# Patient Record
Sex: MALE | Race: WHITE | NOT HISPANIC OR LATINO | Employment: FULL TIME | URBAN - METROPOLITAN AREA
[De-identification: names, ages, dates, MRNs, and addresses within clinical notes are randomized per-mention and may not be internally consistent; named-entity substitution may affect disease eponyms.]

---

## 2018-10-26 ENCOUNTER — OFFICE VISIT (OUTPATIENT)
Dept: URGENT CARE | Facility: URGENT CARE | Age: 43
End: 2018-10-26
Payer: COMMERCIAL

## 2018-10-26 VITALS
SYSTOLIC BLOOD PRESSURE: 125 MMHG | DIASTOLIC BLOOD PRESSURE: 86 MMHG | HEART RATE: 69 BPM | TEMPERATURE: 98.1 F | OXYGEN SATURATION: 98 % | WEIGHT: 184.6 LBS

## 2018-10-26 DIAGNOSIS — M25.50 ARTHRALGIA, UNSPECIFIED JOINT: ICD-10-CM

## 2018-10-26 DIAGNOSIS — M79.10 MYALGIA: Primary | ICD-10-CM

## 2018-10-26 LAB
ALBUMIN SERPL-MCNC: 3.7 G/DL (ref 3.4–5)
ALP SERPL-CCNC: 50 U/L (ref 40–150)
ALT SERPL W P-5'-P-CCNC: 18 U/L (ref 0–70)
ANION GAP SERPL CALCULATED.3IONS-SCNC: 7 MMOL/L (ref 3–14)
AST SERPL W P-5'-P-CCNC: 18 U/L (ref 0–45)
BASOPHILS # BLD AUTO: 0 10E9/L (ref 0–0.2)
BASOPHILS NFR BLD AUTO: 0.2 %
BILIRUB SERPL-MCNC: 0.5 MG/DL (ref 0.2–1.3)
BUN SERPL-MCNC: 17 MG/DL (ref 7–30)
CALCIUM SERPL-MCNC: 8.4 MG/DL (ref 8.5–10.1)
CHLORIDE SERPL-SCNC: 106 MMOL/L (ref 94–109)
CK SERPL-CCNC: 103 U/L (ref 30–300)
CO2 SERPL-SCNC: 26 MMOL/L (ref 20–32)
CREAT SERPL-MCNC: 0.92 MG/DL (ref 0.66–1.25)
CRP SERPL-MCNC: 14.1 MG/L (ref 0–8)
DEPRECATED S PYO AG THROAT QL EIA: NEGATIVE
DIFFERENTIAL METHOD BLD: NORMAL
EOSINOPHIL # BLD AUTO: 0 10E9/L (ref 0–0.7)
EOSINOPHIL NFR BLD AUTO: 0.7 %
ERYTHROCYTE [DISTWIDTH] IN BLOOD BY AUTOMATED COUNT: 12.7 % (ref 10–15)
ERYTHROCYTE [SEDIMENTATION RATE] IN BLOOD BY WESTERGREN METHOD: 10 MM/H (ref 0–15)
GFR SERPL CREATININE-BSD FRML MDRD: 90 ML/MIN/1.7M2
GLUCOSE SERPL-MCNC: 117 MG/DL (ref 70–99)
HCT VFR BLD AUTO: 45 % (ref 40–53)
HGB BLD-MCNC: 15.6 G/DL (ref 13.3–17.7)
LYMPHOCYTES # BLD AUTO: 1 10E9/L (ref 0.8–5.3)
LYMPHOCYTES NFR BLD AUTO: 22.9 %
MCH RBC QN AUTO: 31.3 PG (ref 26.5–33)
MCHC RBC AUTO-ENTMCNC: 34.7 G/DL (ref 31.5–36.5)
MCV RBC AUTO: 90 FL (ref 78–100)
MONOCYTES # BLD AUTO: 0.6 10E9/L (ref 0–1.3)
MONOCYTES NFR BLD AUTO: 14.4 %
NEUTROPHILS # BLD AUTO: 2.7 10E9/L (ref 1.6–8.3)
NEUTROPHILS NFR BLD AUTO: 61.8 %
PLATELET # BLD AUTO: 205 10E9/L (ref 150–450)
POTASSIUM SERPL-SCNC: 3.7 MMOL/L (ref 3.4–5.3)
PROT SERPL-MCNC: 7.6 G/DL (ref 6.8–8.8)
RBC # BLD AUTO: 4.99 10E12/L (ref 4.4–5.9)
SODIUM SERPL-SCNC: 139 MMOL/L (ref 133–144)
SPECIMEN SOURCE: NORMAL
WBC # BLD AUTO: 4.3 10E9/L (ref 4–11)

## 2018-10-26 PROCEDURE — 85652 RBC SED RATE AUTOMATED: CPT | Performed by: PHYSICIAN ASSISTANT

## 2018-10-26 PROCEDURE — 86431 RHEUMATOID FACTOR QUANT: CPT | Performed by: PHYSICIAN ASSISTANT

## 2018-10-26 PROCEDURE — 86618 LYME DISEASE ANTIBODY: CPT | Performed by: PHYSICIAN ASSISTANT

## 2018-10-26 PROCEDURE — 87081 CULTURE SCREEN ONLY: CPT | Performed by: PHYSICIAN ASSISTANT

## 2018-10-26 PROCEDURE — 86039 ANTINUCLEAR ANTIBODIES (ANA): CPT | Performed by: PHYSICIAN ASSISTANT

## 2018-10-26 PROCEDURE — 82550 ASSAY OF CK (CPK): CPT | Performed by: PHYSICIAN ASSISTANT

## 2018-10-26 PROCEDURE — 86200 CCP ANTIBODY: CPT | Performed by: PHYSICIAN ASSISTANT

## 2018-10-26 PROCEDURE — 86140 C-REACTIVE PROTEIN: CPT | Performed by: PHYSICIAN ASSISTANT

## 2018-10-26 PROCEDURE — 87880 STREP A ASSAY W/OPTIC: CPT | Performed by: PHYSICIAN ASSISTANT

## 2018-10-26 PROCEDURE — 86038 ANTINUCLEAR ANTIBODIES: CPT | Performed by: PHYSICIAN ASSISTANT

## 2018-10-26 PROCEDURE — 36415 COLL VENOUS BLD VENIPUNCTURE: CPT | Performed by: PHYSICIAN ASSISTANT

## 2018-10-26 PROCEDURE — 80053 COMPREHEN METABOLIC PANEL: CPT | Performed by: PHYSICIAN ASSISTANT

## 2018-10-26 PROCEDURE — 99204 OFFICE O/P NEW MOD 45 MIN: CPT | Performed by: PHYSICIAN ASSISTANT

## 2018-10-26 PROCEDURE — 85025 COMPLETE CBC W/AUTO DIFF WBC: CPT | Performed by: PHYSICIAN ASSISTANT

## 2018-10-26 RX ORDER — CYCLOBENZAPRINE HCL 10 MG
10 TABLET ORAL
Qty: 20 TABLET | Refills: 1 | Status: ON HOLD | OUTPATIENT
Start: 2018-10-26 | End: 2018-10-29

## 2018-10-26 RX ORDER — NAPROXEN 500 MG/1
500 TABLET ORAL 2 TIMES DAILY PRN
Qty: 30 TABLET | Refills: 1 | Status: SHIPPED | OUTPATIENT
Start: 2018-10-26 | End: 2022-09-19

## 2018-10-26 NOTE — MR AVS SNAPSHOT
"              After Visit Summary   10/26/2018    Geovany Sarmiento    MRN: [id number]           Patient Information     Date Of Birth          1975        Visit Information        Provider Department      10/26/2018 2:05 PM Galina Rodriguez PA-C [facility]        Today's Diagnoses     Myalgia    -  1    Arthralgia, unspecified joint          Care Instructions    Obtain Primary and follow up next week for muscle and joint pain          Follow-ups after your visit        Who to contact     If you have questions or need follow up information about today's clinic visit or your schedule please contact [facility] directly at [phone].  Normal or non-critical lab and imaging results will be communicated to you by MyChart, letter or phone within 4 business days after the clinic has received the results. If you do not hear from us within 7 days, please contact the clinic through MyChart or phone. If you have a critical or abnormal lab result, we will notify you by phone as soon as possible.  Submit refill requests through [employer] or call your pharmacy and they will forward the refill request to us. Please allow 3 business days for your refill to be completed.          Additional Information About Your Visit        MyChart Information     [employer] lets you send messages to your doctor, view your test results, renew your prescriptions, schedule appointments and more. To sign up, go to [email or web address] . Click on \"Log in\" on the left side of the screen, which will take you to the Welcome page. Then click on \"Sign up Now\" on the right side of the page.     You will be asked to enter the access code listed below, as well as some personal information. Please follow the directions to create your username and password.     Your access code is: [email or web address]  Expires: 2019  3:51 PM     Your access code will  in 90 days. If you need help or a new code, please call your " [facility] or [phone].        Care EveryWhere ID     This is your Care EveryWhere ID. This could be used by other organizations to access your [facility] medical records  [id number]        Your Vitals Were     Pulse Temperature Pulse Oximetry             69 98.1  F (36.7  C) (Oral) 98%          Blood Pressure from Last 3 Encounters:   10/26/18 125/86    Weight from Last 3 Encounters:   10/26/18 184 lb 9.6 oz (83.7 kg)              We Performed the Following     Anti Nuclear Constance IgG by IFA with Reflex     CBC with platelets differential     CK total     Comprehensive metabolic panel     CRP inflammation     Cyclic Citrullinated Peptide Antibody IgG     ESR: Erythrocyte sedimentation rate     Lyme Disease Constance with reflex to WB Serum     Rapid strep screen     Rheumatoid factor          Today's Medication Changes          These changes are accurate as of 10/26/18  3:51 PM.  If you have any questions, ask your nurse or doctor.               Start taking these medicines.        Dose/Directions    cyclobenzaprine 10 MG tablet   Commonly known as:  FLEXERIL   Used for:  Myalgia, Arthralgia, unspecified joint   Started by:  Galina Rodriguez PA-C        Dose:  10 mg   Take 1 tablet (10 mg) by mouth nightly as needed for muscle spasms   Quantity:  20 tablet   Refills:  1       naproxen 500 MG tablet   Commonly known as:  NAPROSYN   Used for:  Myalgia, Arthralgia, unspecified joint   Started by:  Galina Rodriguez PA-C        Dose:  500 mg   Take 1 tablet (500 mg) by mouth 2 times daily as needed for moderate pain   Quantity:  30 tablet   Refills:  1            Where to get your medicines      These medications were sent to [facility] - [place], [place] - [address]  [address], [facility] [address]     Phone:  [phone]     cyclobenzaprine 10 MG tablet    naproxen 500 MG tablet                Primary Care Provider Fax #    Provider Not In System [phone]                 Equal Access to Services     [facility] : Hadii aad ku hadbeverlyjesica Irmaali, waaxda luqadaha, qaybta kaalmalily kelly. So [facility] [phone].    ATENCIÓN: Si habla español, tiene a snyder disposición servicios gratuitos de asistencia lingüística. [place] al [phone].    We comply with applicable federal civil rights laws and [place] laws. We do not discriminate on the basis of race, color, national origin, age, disability, sex, sexual orientation, or gender identity.            Thank you!     Thank you for choosing [facility]  for your care. Our goal is always to provide you with excellent care. Hearing back from our patients is one way we can continue to improve our services. Please take a few minutes to complete the written survey that you may receive in the mail after your visit with us. Thank you!             Your Updated Medication List - Protect others around you: Learn how to safely use, store and throw away your medicines at www.disposemymeds.org.          This list is accurate as of 10/26/18  3:51 PM.  Always use your most recent med list.                   Brand Name Dispense Instructions for use Diagnosis    cyclobenzaprine 10 MG tablet    FLEXERIL    20 tablet    Take 1 tablet (10 mg) by mouth nightly as needed for muscle spasms    Myalgia, Arthralgia, unspecified joint       naproxen 500 MG tablet    NAPROSYN    30 tablet    Take 1 tablet (500 mg) by mouth 2 times daily as needed for moderate pain    Myalgia, Arthralgia, unspecified joint

## 2018-10-26 NOTE — PROGRESS NOTES
44 yo male here for evaluation of myalgias and arthralgias that started Wednesday, approximately 48 hours ago.  No prior history of anything like this.  No family history of rheumatoid arthritis.  He notes he also has a sore throat with it.  No cough.  As above.  He now he denies fever.  No rashes.  No dysuria, frequency urgency.  He notices motor weakness.  It was hard to even lift a gallon of milk this morning.  No dysuria, frequency, or urgency.  No headache, dizziness, shortness of breath or chest pain.      No Known Allergies    No past medical history on file.  Not diabetic.      No current outpatient prescriptions on file prior to visit.  No current facility-administered medications on file prior to visit.     Social History   Substance Use Topics     Smoking status: Never Smoker     Smokeless tobacco: Never Used     Alcohol use Not on file       ROS:  CONSTITUTIONAL: Negative for fatigue or fever.  EYES: Negative for eye problems.  ENT: As above.  RESP: As above.  CV: Negative for chest pains.  GI: Negative for vomiting.  MUSCULOSKELETAL: As above   NEUROLOGIC: Negative for headaches.  SKIN: Negative for rash.    OBJECTIVE:  /86 (BP Location: Left arm, Patient Position: Chair, Cuff Size: Adult Regular)  Pulse 69  Temp 98.1  F (36.7  C) (Oral)  Wt 184 lb 9.6 oz (83.7 kg)  SpO2 98%  GENERAL APPEARANCE: Healthy, alert and no distress.  EYES:Conjunctiva/sclera clear.  EARS: No cerumen.   Ear canals w/o erythema.  TM's intact w/o erythema.    NOSE/MOUTH: Nose without ulcers, erythema or lesions.  SINUSES: No maxillary sinus tenderness.  THROAT: No erythema w/o tonsillar enlargement . No exudates.  NECK: Supple, nontender, no lymphadenopathy.  RESP: Lungs clear to auscultation - no rales, rhonchi or wheezes  CV: Regular rate and rhythm, normal S1 S2, no murmur noted.  NEURO: Awake, alert    SKIN: No rashes  Shoulders, elbows, wrists, hips, knees with mild bilateral tenderness.  No skin color change noted  over the joints.  No actual swelling over the joints noted.  Motor- upper and lower extremities 5/5.  He does have some giveaway due to pain.  Reflexes symmetric and physiologic throughout upper and lower extremities.    Results for orders placed or performed in visit on 10/26/18   CBC with platelets differential   Result Value Ref Range    WBC 4.3 4.0 - 11.0 10e9/L    RBC Count 4.99 4.4 - 5.9 10e12/L    Hemoglobin 15.6 13.3 - 17.7 g/dL    Hematocrit 45.0 40.0 - 53.0 %    MCV 90 78 - 100 fl    MCH 31.3 26.5 - 33.0 pg    MCHC 34.7 31.5 - 36.5 g/dL    RDW 12.7 10.0 - 15.0 %    Platelet Count 205 150 - 450 10e9/L    % Neutrophils 61.8 %    % Lymphocytes 22.9 %    % Monocytes 14.4 %    % Eosinophils 0.7 %    % Basophils 0.2 %    Absolute Neutrophil 2.7 1.6 - 8.3 10e9/L    Absolute Lymphocytes 1.0 0.8 - 5.3 10e9/L    Absolute Monocytes 0.6 0.0 - 1.3 10e9/L    Absolute Eosinophils 0.0 0.0 - 0.7 10e9/L    Absolute Basophils 0.0 0.0 - 0.2 10e9/L    Diff Method Automated Method    ESR: Erythrocyte sedimentation rate   Result Value Ref Range    Sed Rate 10 0 - 15 mm/h   Rapid strep screen   Result Value Ref Range    Specimen Description Throat     Rapid Strep A Screen Negative        ASSESSMENT:     ICD-10-CM    1. Myalgia M79.10 CBC with platelets differential     ESR: Erythrocyte sedimentation rate     CRP inflammation     CK total     Comprehensive metabolic panel     Rheumatoid factor     Lyme Disease Constance with reflex to WB Serum     Cyclic Citrullinated Peptide Antibody IgG     Anti Nuclear Constance IgG by IFA with Reflex     Rapid strep screen   2. Arthralgia, unspecified joint M25.50 CBC with platelets differential     ESR: Erythrocyte sedimentation rate     CRP inflammation     CK total     Comprehensive metabolic panel     Rheumatoid factor     Lyme Disease Constance with reflex to WB Serum     Cyclic Citrullinated Peptide Antibody IgG     Anti Nuclear Constance IgG by IFA with Reflex     Rapid strep screen         PLAN: Unclear  etiology. No fever or cough.  With acute onset I wonder about infectious etiology although complete blood cell count was normal here tonight. Rest of labs pending and will contact him with results.  If pain worsens or fever or new neurologic signs or symptoms develop over the weekend he should go to the emergency room.  Otherwise obtain a primary and follow-up next week.  Lots of rest and fluids.  RTC if any worsening symptoms or if not improving.    Galina Rodriguez PA-C

## 2018-10-27 ENCOUNTER — APPOINTMENT (OUTPATIENT)
Dept: MRI IMAGING | Facility: CLINIC | Age: 43
DRG: 558 | End: 2018-10-27
Attending: EMERGENCY MEDICINE
Payer: COMMERCIAL

## 2018-10-27 ENCOUNTER — HOSPITAL ENCOUNTER (INPATIENT)
Facility: CLINIC | Age: 43
LOS: 2 days | Discharge: HOME | DRG: 558 | End: 2018-10-29
Attending: EMERGENCY MEDICINE | Admitting: PSYCHIATRY & NEUROLOGY
Payer: COMMERCIAL

## 2018-10-27 DIAGNOSIS — R53.1 WEAKNESS: ICD-10-CM

## 2018-10-27 LAB
ALBUMIN SERPL-MCNC: 4 G/DL (ref 3.4–5)
ALP SERPL-CCNC: 56 U/L (ref 40–150)
ALT SERPL W P-5'-P-CCNC: 22 U/L (ref 0–70)
ANION GAP SERPL CALCULATED.3IONS-SCNC: 8 MMOL/L (ref 3–14)
AST SERPL W P-5'-P-CCNC: 34 U/L (ref 0–45)
BACTERIA SPEC CULT: NORMAL
BASOPHILS # BLD AUTO: 0 10E9/L (ref 0–0.2)
BASOPHILS NFR BLD AUTO: 0.4 %
BILIRUB SERPL-MCNC: 0.4 MG/DL (ref 0.2–1.3)
BUN SERPL-MCNC: 24 MG/DL (ref 7–30)
CALCIUM SERPL-MCNC: 8.3 MG/DL (ref 8.5–10.1)
CHLORIDE SERPL-SCNC: 106 MMOL/L (ref 94–109)
CK SERPL-CCNC: 474 U/L (ref 30–300)
CO2 SERPL-SCNC: 26 MMOL/L (ref 20–32)
CREAT SERPL-MCNC: 0.83 MG/DL (ref 0.66–1.25)
CRP SERPL-MCNC: 6.4 MG/L (ref 0–8)
DIFFERENTIAL METHOD BLD: NORMAL
EOSINOPHIL # BLD AUTO: 0.1 10E9/L (ref 0–0.7)
EOSINOPHIL NFR BLD AUTO: 2.3 %
ERYTHROCYTE [DISTWIDTH] IN BLOOD BY AUTOMATED COUNT: 12.7 % (ref 10–15)
ERYTHROCYTE [SEDIMENTATION RATE] IN BLOOD BY WESTERGREN METHOD: 11 MM/H (ref 0–15)
FOLATE SERPL-MCNC: 11.4 NG/ML
GFR SERPL CREATININE-BSD FRML MDRD: >90 ML/MIN/1.7M2
GLUCOSE SERPL-MCNC: 103 MG/DL (ref 70–99)
HCT VFR BLD AUTO: 46.2 % (ref 40–53)
HGB BLD-MCNC: 16.1 G/DL (ref 13.3–17.7)
IMM GRANULOCYTES # BLD: 0 10E9/L (ref 0–0.4)
IMM GRANULOCYTES NFR BLD: 0 %
LYMPHOCYTES # BLD AUTO: 1.3 10E9/L (ref 0.8–5.3)
LYMPHOCYTES NFR BLD AUTO: 26.4 %
MCH RBC QN AUTO: 31.9 PG (ref 26.5–33)
MCHC RBC AUTO-ENTMCNC: 34.8 G/DL (ref 31.5–36.5)
MCV RBC AUTO: 92 FL (ref 78–100)
MONOCYTES # BLD AUTO: 0.4 10E9/L (ref 0–1.3)
MONOCYTES NFR BLD AUTO: 8 %
NEUTROPHILS # BLD AUTO: 3.1 10E9/L (ref 1.6–8.3)
NEUTROPHILS NFR BLD AUTO: 62.9 %
NRBC # BLD AUTO: 0 10*3/UL
NRBC BLD AUTO-RTO: 0 /100
PLATELET # BLD AUTO: 209 10E9/L (ref 150–450)
POTASSIUM SERPL-SCNC: 3.8 MMOL/L (ref 3.4–5.3)
PROT SERPL-MCNC: 7.9 G/DL (ref 6.8–8.8)
RBC # BLD AUTO: 5.04 10E12/L (ref 4.4–5.9)
SODIUM SERPL-SCNC: 141 MMOL/L (ref 133–144)
SPECIMEN SOURCE: NORMAL
TSH SERPL DL<=0.005 MIU/L-ACNC: 1.58 MU/L (ref 0.4–4)
VIT B12 SERPL-MCNC: 563 PG/ML (ref 193–986)
WBC # BLD AUTO: 4.9 10E9/L (ref 4–11)

## 2018-10-27 PROCEDURE — 82525 ASSAY OF COPPER: CPT | Performed by: STUDENT IN AN ORGANIZED HEALTH CARE EDUCATION/TRAINING PROGRAM

## 2018-10-27 PROCEDURE — A9585 GADOBUTROL INJECTION: HCPCS | Performed by: EMERGENCY MEDICINE

## 2018-10-27 PROCEDURE — 82607 VITAMIN B-12: CPT | Performed by: EMERGENCY MEDICINE

## 2018-10-27 PROCEDURE — 84443 ASSAY THYROID STIM HORMONE: CPT | Performed by: STUDENT IN AN ORGANIZED HEALTH CARE EDUCATION/TRAINING PROGRAM

## 2018-10-27 PROCEDURE — 86140 C-REACTIVE PROTEIN: CPT | Performed by: EMERGENCY MEDICINE

## 2018-10-27 PROCEDURE — 82550 ASSAY OF CK (CPK): CPT | Performed by: EMERGENCY MEDICINE

## 2018-10-27 PROCEDURE — 80053 COMPREHEN METABOLIC PANEL: CPT | Performed by: EMERGENCY MEDICINE

## 2018-10-27 PROCEDURE — 96361 HYDRATE IV INFUSION ADD-ON: CPT | Performed by: EMERGENCY MEDICINE

## 2018-10-27 PROCEDURE — 85652 RBC SED RATE AUTOMATED: CPT | Performed by: STUDENT IN AN ORGANIZED HEALTH CARE EDUCATION/TRAINING PROGRAM

## 2018-10-27 PROCEDURE — 86780 TREPONEMA PALLIDUM: CPT | Performed by: EMERGENCY MEDICINE

## 2018-10-27 PROCEDURE — 72156 MRI NECK SPINE W/O & W/DYE: CPT

## 2018-10-27 PROCEDURE — 84165 PROTEIN E-PHORESIS SERUM: CPT | Performed by: EMERGENCY MEDICINE

## 2018-10-27 PROCEDURE — 99285 EMERGENCY DEPT VISIT HI MDM: CPT | Mod: Z6 | Performed by: EMERGENCY MEDICINE

## 2018-10-27 PROCEDURE — 00000402 ZZHCL STATISTIC TOTAL PROTEIN: Performed by: EMERGENCY MEDICINE

## 2018-10-27 PROCEDURE — 82746 ASSAY OF FOLIC ACID SERUM: CPT | Performed by: EMERGENCY MEDICINE

## 2018-10-27 PROCEDURE — 85652 RBC SED RATE AUTOMATED: CPT | Performed by: EMERGENCY MEDICINE

## 2018-10-27 PROCEDURE — 25000132 ZZH RX MED GY IP 250 OP 250 PS 637: Performed by: STUDENT IN AN ORGANIZED HEALTH CARE EDUCATION/TRAINING PROGRAM

## 2018-10-27 PROCEDURE — 99285 EMERGENCY DEPT VISIT HI MDM: CPT | Mod: 25 | Performed by: EMERGENCY MEDICINE

## 2018-10-27 PROCEDURE — 25500064 ZZH RX 255 OP 636: Performed by: EMERGENCY MEDICINE

## 2018-10-27 PROCEDURE — 85025 COMPLETE CBC W/AUTO DIFF WBC: CPT | Performed by: EMERGENCY MEDICINE

## 2018-10-27 PROCEDURE — 25000128 H RX IP 250 OP 636: Performed by: EMERGENCY MEDICINE

## 2018-10-27 PROCEDURE — 84630 ASSAY OF ZINC: CPT | Performed by: STUDENT IN AN ORGANIZED HEALTH CARE EDUCATION/TRAINING PROGRAM

## 2018-10-27 PROCEDURE — 96374 THER/PROPH/DIAG INJ IV PUSH: CPT | Performed by: EMERGENCY MEDICINE

## 2018-10-27 PROCEDURE — 36415 COLL VENOUS BLD VENIPUNCTURE: CPT | Performed by: STUDENT IN AN ORGANIZED HEALTH CARE EDUCATION/TRAINING PROGRAM

## 2018-10-27 PROCEDURE — 12000008 ZZH R&B INTERMEDIATE UMMC

## 2018-10-27 RX ORDER — ACETAMINOPHEN 325 MG/1
975 TABLET ORAL EVERY 8 HOURS PRN
Status: DISCONTINUED | OUTPATIENT
Start: 2018-10-27 | End: 2018-10-29 | Stop reason: HOSPADM

## 2018-10-27 RX ORDER — MAGNESIUM SULFATE HEPTAHYDRATE 40 MG/ML
4 INJECTION, SOLUTION INTRAVENOUS EVERY 4 HOURS PRN
Status: DISCONTINUED | OUTPATIENT
Start: 2018-10-27 | End: 2018-10-29 | Stop reason: HOSPADM

## 2018-10-27 RX ORDER — AMOXICILLIN 250 MG
1 CAPSULE ORAL
Status: DISCONTINUED | OUTPATIENT
Start: 2018-10-27 | End: 2018-10-29 | Stop reason: HOSPADM

## 2018-10-27 RX ORDER — AMOXICILLIN 250 MG
2 CAPSULE ORAL
Status: DISCONTINUED | OUTPATIENT
Start: 2018-10-27 | End: 2018-10-29 | Stop reason: HOSPADM

## 2018-10-27 RX ORDER — POTASSIUM CHLORIDE 1.5 G/1.58G
20-40 POWDER, FOR SOLUTION ORAL
Status: DISCONTINUED | OUTPATIENT
Start: 2018-10-27 | End: 2018-10-29 | Stop reason: HOSPADM

## 2018-10-27 RX ORDER — POTASSIUM CHLORIDE 7.45 MG/ML
10 INJECTION INTRAVENOUS
Status: DISCONTINUED | OUTPATIENT
Start: 2018-10-27 | End: 2018-10-29 | Stop reason: HOSPADM

## 2018-10-27 RX ORDER — KETOROLAC TROMETHAMINE 30 MG/ML
30 INJECTION, SOLUTION INTRAMUSCULAR; INTRAVENOUS ONCE
Status: COMPLETED | OUTPATIENT
Start: 2018-10-27 | End: 2018-10-27

## 2018-10-27 RX ORDER — POTASSIUM CHLORIDE 750 MG/1
20-40 TABLET, EXTENDED RELEASE ORAL
Status: DISCONTINUED | OUTPATIENT
Start: 2018-10-27 | End: 2018-10-29 | Stop reason: HOSPADM

## 2018-10-27 RX ORDER — LIDOCAINE HYDROCHLORIDE 10 MG/ML
INJECTION, SOLUTION INFILTRATION; PERINEURAL
Status: DISCONTINUED
Start: 2018-10-27 | End: 2018-10-27 | Stop reason: HOSPADM

## 2018-10-27 RX ORDER — POTASSIUM CHLORIDE 29.8 MG/ML
20 INJECTION INTRAVENOUS
Status: DISCONTINUED | OUTPATIENT
Start: 2018-10-27 | End: 2018-10-29 | Stop reason: HOSPADM

## 2018-10-27 RX ORDER — SODIUM CHLORIDE 9 MG/ML
1000 INJECTION, SOLUTION INTRAVENOUS CONTINUOUS
Status: DISCONTINUED | OUTPATIENT
Start: 2018-10-27 | End: 2018-10-28

## 2018-10-27 RX ORDER — GADOBUTROL 604.72 MG/ML
10 INJECTION INTRAVENOUS ONCE
Status: COMPLETED | OUTPATIENT
Start: 2018-10-27 | End: 2018-10-27

## 2018-10-27 RX ORDER — NALOXONE HYDROCHLORIDE 0.4 MG/ML
.1-.4 INJECTION, SOLUTION INTRAMUSCULAR; INTRAVENOUS; SUBCUTANEOUS
Status: DISCONTINUED | OUTPATIENT
Start: 2018-10-27 | End: 2018-10-29 | Stop reason: HOSPADM

## 2018-10-27 RX ORDER — POTASSIUM CL/LIDO/0.9 % NACL 10MEQ/0.1L
10 INTRAVENOUS SOLUTION, PIGGYBACK (ML) INTRAVENOUS
Status: DISCONTINUED | OUTPATIENT
Start: 2018-10-27 | End: 2018-10-29 | Stop reason: HOSPADM

## 2018-10-27 RX ADMIN — GADOBUTROL 10 ML: 604.72 INJECTION INTRAVENOUS at 20:05

## 2018-10-27 RX ADMIN — ACETAMINOPHEN 975 MG: 325 TABLET, FILM COATED ORAL at 22:40

## 2018-10-27 RX ADMIN — KETOROLAC TROMETHAMINE 30 MG: 30 INJECTION, SOLUTION INTRAMUSCULAR at 16:52

## 2018-10-27 RX ADMIN — SODIUM CHLORIDE 1000 ML: 9 INJECTION, SOLUTION INTRAVENOUS at 16:51

## 2018-10-27 RX ADMIN — POTASSIUM CHLORIDE 20 MEQ: 750 TABLET, EXTENDED RELEASE ORAL at 22:40

## 2018-10-27 RX ADMIN — SODIUM CHLORIDE 1000 ML: 9 INJECTION, SOLUTION INTRAVENOUS at 22:40

## 2018-10-27 ASSESSMENT — ENCOUNTER SYMPTOMS
TROUBLE SWALLOWING: 0
NUMBNESS: 0
FEVER: 0
ABDOMINAL PAIN: 0
WEAKNESS: 1
DIAPHORESIS: 0
DIARRHEA: 0
EYE PAIN: 0
APPETITE CHANGE: 0
RHINORRHEA: 0
HEADACHES: 0
NECK PAIN: 0
COUGH: 0
MYALGIAS: 1
NECK STIFFNESS: 1
VOMITING: 0

## 2018-10-27 NOTE — IP AVS SNAPSHOT
MRN:[id number]                      After Visit Summary   10/27/2018    Geovany Sarmiento    MRN: [id number]           Thank you!     Thank you for choosing [facility] for your care. Our goal is always to provide you with excellent care. Hearing back from our patients is one way we can continue to improve our services. Please take a few minutes to complete the written survey that you may receive in the mail after you visit with us. Thank you!        Patient Information     Date Of Birth          1975        Designated Caregiver       Most Recent Value    Caregiver    Will someone help with your care after discharge? yes    Name of designated caregiver Nitza Sarmiento    Phone number of caregiver [phone]    Caregiver address same as his      About your hospital stay     You were admitted on:  October 27, 2018 You last received care in the:  [address] [facility]    You were discharged on:  October 29, 2018        Reason for your hospital stay       You were admitted for weakness and myalgias affecting your upper extremities predominately. You have improved substantially over the course of your hospitalization and we think you likely had a viral myositis.     You should take the rest of the week off of work, if possible.     We recommend you follow-up with outpatient neurology in ~2 weeks with Dr. Castro to ensure complete resolution of your symptoms. If your symptoms worsen in the next few days, we would recommend that you return to the emergency department for additional care/evaluation.     Use naproxen/iburopfen as needed for muscle pains.                  Who to Call     For medical emergencies, please call 911.  For non-urgent questions about your medical care, please call your primary care provider or clinic, None          Attending Provider     Provider Specialty    Juan José Fernandes MD Emergency Medicine    Guillaume, Jos Blount MD Neurology    Alber Castro MD Neurology        Primary Care Provider    None Specified      After Care Instructions     Activity       Your activity upon discharge: activity as tolerated            Diet       Follow this diet upon discharge: Orders Placed This Encounter      Combination Diet Regular Diet Adult            Discharge Instructions       As above                  Follow-up Appointments     Adult [facility] Follow-up and recommended labs and tests       Follow up with neurology in 2 weeks, referral made.       Appointments on [place] and/or [facility] (with [facility] or [facility] provider or service). Call [phone] if you haven't heard regarding these appointments within 7 days of discharge.                  Your next 10 appointments already scheduled     Oct 30, 2018 12:20 PM CDT   Office Visit with Kristen M Kehr, [facility]-C   [facility]    [address] [address] [phone]           Bring a current list of meds and any records pertaining to this visit. For Physicals, please bring immunization records and any forms needing to be filled out. Please arrive 10 minutes early to complete paperwork.              Additional Services     Neurology Adult Referral       Follow-up with Dr. Castro in 2 weeks as outpatient            Occupational Therapy Referral       If you have not heard from the scheduling office within 2 business days, please call [phone] for all locations, with the exception of [place], please call [phone] and [place], please call [phone].    Please be aware that coverage of these services is subject to the terms and limitations of your health insurance plan.  Call member services at your health plan with any benefit or coverage questions.                  Pending Results     Date and Time Order Name Status Description    10/28/2018 0947 Polymyositis and Dermatomyositis Panel In process     10/28/2018 0851 Blood Morphology Pathologist Review In process      "10/28/2018 0851 Blood metal panel In process     10/27/2018 1759 Zinc In process     10/27/2018 1759 Copper level In process     10/27/2018 1653 Protein electrophoresis In process     10/26/2018 1513 ANTI NUCLEAR CLAYTON IGG BY IFA WITH REFLEX In process     10/26/2018 1513 CYCLIC CITRULLINATED PEPTIDE ANTIBODY IGG In process             Statement of Approval     Ordered          10/29/18 1219  I have reviewed and agree with all the recommendations and orders detailed in this document.  EFFECTIVE NOW     Approved and electronically signed by:  Samina Michele MD             Admission Information     Date & Time Provider Department Dept. Phone    10/27/2018 Alber Castro MD [address] [facility] [phone]      Your Vitals Were     Blood Pressure Pulse Temperature Respirations Height Weight    127/76 (BP Location: Right arm) 54 96.9  F (36.1  C) (Oral) 16 1.753 m (5' 9\") 83.1 kg (183 lb 3.2 oz)    Pulse Oximetry BMI (Body Mass Index)                99% 27.05 kg/m2          [employer] Information     [employer] lets you send messages to your doctor, view your test results, renew your prescriptions, schedule appointments and more. To sign up, go to [email or web address] . Click on \"Log in\" on the left side of the screen, which will take you to the Welcome page. Then click on \"Sign up Now\" on the right side of the page.     You will be asked to enter the access code listed below, as well as some personal information. Please follow the directions to create your username and password.     Your access code is: [email or web address]  Expires: 2019  3:51 PM     Your access code will  in 90 days. If you need help or a new code, please call your [place] clinic or [phone].        Care EveryWhere ID     This is your Care EveryWhere ID. This could be used by other organizations to access your [place] medical records  [id number]        Equal Access to Services     BRI MENDOZA AH: jose Bales " jim kerryranjithaddy jacintaterry catelily darling zhanein hayaan adeeg kharash la'aan ah. So [facility] [phone].    ATENCIÓN: Si farrah love, tiene a snyder disposición servicios gratuitos de asistencia lingüística. Llame al [phone].    We comply with applicable federal civil rights laws and [place] laws. We do not discriminate on the basis of race, color, national origin, age, disability, sex, sexual orientation, or gender identity.               Review of your medicines      CONTINUE these medicines which have NOT CHANGED        Dose / Directions    naproxen 500 MG tablet   Commonly known as:  NAPROSYN   Used for:  Myalgia, Arthralgia, unspecified joint        Dose:  500 mg   Take 1 tablet (500 mg) by mouth 2 times daily as needed for moderate pain   Quantity:  30 tablet   Refills:  1         STOP taking     cyclobenzaprine 10 MG tablet   Commonly known as:  FLEXERIL                    Protect others around you: Learn how to safely use, store and throw away your medicines at www.disposemymeds.org.             Medication List: This is a list of all your medications and when to take them. Check marks below indicate your daily home schedule. Keep this list as a reference.      Medications           Morning Afternoon Evening Bedtime As Needed    naproxen 500 MG tablet   Commonly known as:  NAPROSYN   Take 1 tablet (500 mg) by mouth 2 times daily as needed for moderate pain

## 2018-10-27 NOTE — LETTER
[facility]     October 29, 2018      Re: Geovany Sarmiento  YOB: 1975                                                           [address]  [facility] [address]    To Whom It May Concern:     Mr. Sarmiento was recently in the hospital for a medical condition. He should remain at home for the remainder of the current work week. He may return to work on Monday, November 5, 2018.     Sincerely,      Samina Michele MD  [phone]

## 2018-10-27 NOTE — H&P
[facility]: [place]  Neurology History and Physical    Patient Name:  Geovany Sarmiento  MRN:  [id number]    :  1975  Date of Admission:  10/27/2018  Date of Service:  2018  Primary care provider:  System, Provider Not In      Chief Complaint: Weakness and myalgias     History of Present Illness:   43 year old male with no significant PMH who presents with diffuse myalgias and distal upper extremity weakness onset over Wednesday night (~3 days ago). He describes that he is in good health prior to onset of diffuse muscle pains on Wednesday night which developed in the evening.  He tried to wake up and go to work on Thursday morning but he had significant myalgias and he left work to go home.  He slept most of Thursday and on Friday woke up and noted that his hands were substantially weak he had trouble gripping a milk jug, writing his name, or carrying plates.  Fine motor skills in his hands are also reduced.  He denies sensory symptoms of numbness/tingling. He went to primary care where CK was notably normal, CRP was mildly elevated at 14.1 and the remainder his labs were unremarkable.  He continued to have substantial weakness which brought him to the [place] emergency department today.  He does not think his symptoms have progressed substantially since onset.  His wife questions this because he slept and did not work very much on Thursday. He endorse a sore throat with onset at the same time as myalgias.  He notes that his 5-month-old daughter was sick about a week ago, denies prior illness in the past 1-2 weeks. He endorses anorexia Wednesday and Thursday which improved the subsequent days. He has had no fever, no fatigue, no diarrhea, or headache. He denies any constipation or urinary retention.  He has no point tenderness neck or back pain but substantial paraspinal muscle pain.  He denies chiropractic manipulations, extraneous activities, or self cracks of his  neck. He has been unpacking boxes but he does not think that this is been particularly challenge for him he also picks up his children 5 months and 3 years old with the heaviest being 29 pounds.  He has been taking significant amounts of ibuprofen 800 mg at least 2 times a day without substantial relief. He also has taken naproxen that was prescribed by his primary care but did not start a muscle relaxer prescribed at that visit.  He denies tick bites or rashes.  No recent camping, but he did take a hayride recently. Cannot think of any other out of the ordinary activities.     He has an interesting career as a  and he works with many compounds including zinc, cyanide, and nickel to name a few.     ROS: A 10-point ROS was performed as per HPI with pertinent positive/negatives in the HPI or below.     PMH:  Past Medical History:   Diagnosis Date     Cyst near tailbone      History reviewed. No pertinent surgical history.    Allergies:  Allergies   Allergen Reactions     Contrast Dye Hives       Medications:      Current Facility-Administered Medications:      lidocaine 1 % injection, , , ,      [COMPLETED] 0.9% sodium chloride BOLUS, 1,000 mL, Intravenous, Once, Stopped at 10/27/18 1814 **FOLLOWED BY** sodium chloride 0.9% infusion, 1,000 mL, Intravenous, Continuous, Juan José Fernandes MD    Current Outpatient Prescriptions:      naproxen (NAPROSYN) 500 MG tablet, Take 1 tablet (500 mg) by mouth 2 times daily as needed for moderate pain, Disp: 30 tablet, Rfl: 1     cyclobenzaprine (FLEXERIL) 10 MG tablet, Take 1 tablet (10 mg) by mouth nightly as needed for muscle spasms, Disp: 20 tablet, Rfl: 1  No regular medications.     Social History:  Social History   Substance Use Topics     Smoking status: Never Smoker     Smokeless tobacco: Never Used     Alcohol use Yes      Comment: 2-3 drinks/wk     Family History:    No significant family history.  No autoimmune family history.  No substantial neurologic  family history.     Physical Examination:   Vitals:  B/P: 129/76, T: 98.5, P: 71, R: 16  General:  Adult, in NAD, cooperative  HEENT: NC/AT, no icterus, op pink and moist   Cardiac:  RRR  Chest:  No respiratory distress.  Counts to 30 with a single breath.   Abdomen:  S/NT/ND  Extremities:  No LE swelling.    Skin:  No rash or lesion noted  Psych:  Normal mood and affect    Neuro:  Mental status: Alert, attentive, oriented to p/p/t. Follows commands. Fund of knowledge appropriate. Speech is fluent and comprehension intact. No dysarthria.  Cranial nerves: Eyes conjugate, PERRLA, EOMI, visual fields full, face symmetric, facial sensation intact, shoulder shrug strong, tongue/uvula midline, palate rise symmetric, hearing intact to conversation.   Motor:  Tone normal. No atrophy. No abnormal movements. No pronator drift.    RIGHT LEFT   Neck flexion 5/5     2/5 2/5   FDI 3-/5 3-/5   ADP 3/5 3/5   Biceps 3+/5 3+/5   Triceps 4+/5 5/5   Deltoid 4/5 4/5   Hip flexion 4/5 4/5   Knee extension 5/5 5/5   Knee flexion 5/5 5/5   Dorsiflexion 5/5 5/5   Plantarflexion  5/5 5/5   Toe extensor 4/5 4/5       Reflexes: Symmetric reflexes - triceps 2+, BL biceps 2+, brachioradialis 1+, patellar 3+, no crossed adductors, achilles 0. Toes flexor, but sensitive. Alternative testing unable to elicit response. Negative beck.   Sensory: No sensory ataxia.  Intact to light touch.  Pinprick with increasing sensitivity as you move up bilateral lower extremities and wrist in a symmetric pattern -normal sensation at mid shin and above, and mid forearm and above.   Coordination:  FNF no dysmetria. HTS intact.  Gait: Did not assess    Investigations:    BMP within normal limits, CBC within normal limits. .     Assessment and Plan:  Mr. Sarmiento is a 43-year-old man with no significant past medical history who had acute onset of myalgias starting 3 days ago with weakness most prominent in distal upper extremities in a largely symmetric  pattern.  His exam is noteworthy for symmetric bilateral weakness most substantial in his hands and ascending upward. No substantial lower extremity weakness except for his toes. He has notably absent Achilles reflexes, but he has quite brisk patellar without apparent abductor spread positive Nelia or positive Babinski. Additionally despite denying sensory symptoms he has ascending decreased pick pinprick in a symmetric pattern in feet and arms. Repeat labs are noteworthy for normalization of CRP, no obvious inflammation, but elevated CK at 474 when compared to normal yesterday. Differential includes AIDP, myopathy (viral/infectious/rheumatologic), or metabolic/nutritional/infectious myelopathy.   -LP (cell, protein, glucose, gram stain, culture, enterovirus, lyme, West Nile)   -Zinc, Copper, B12, Folate, ELP   -MRI C spine with contrast   -If protein elevated in LP with largely normal cell count, consider initiating IVIG   -NIF/VC q6h once on floor   -Neurochecks Q4     FEN: Regular diet/ ml/hr  PPx: SCDs  Code: Full     Patient was seen and discussed with Dr. Galaviz.     Samina Michele MD  Neurology PGY 2   [phone]    I saw and evaluated the patient on 10/28/2018 and agree with the findings and the plan of care as documented in the resident's note except for changes noted below.      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  On my exam there were no sensory findings and reflexes were intact.  Lower extremity strength intact, however distal upper extremity strength with significant deficits 3+ to 4 in finger extensors, finger flexors, FDI, APB, thumb extension.  L appears slightly weaker than R.  He has been stable over past 24 hours.  Localization points to myopathy as source although distribution is unusual.  Variant GBS is felt less likely.  CK  downtrending. Will order serum studies including heavy metals and myositis panel.  Will trend CK.  Unable to obtain LP today to work up variant GBS, however I am comfortable monitoring clinically given stable course in past 24 hours and full neck strength.  I am suspicious for viral focal myositis.  Changes likely too acute to be assessed with EMG in which symptoms typically should be present for ~7 days so we will obtain MRI of L forearm.  If clinically worsening or MRI L forearm not diagnostic would will do LP to further evaluate.   NIF/FVC not documented so will need to respiratory to perform.      Jos Galaviz DO   of Neurology

## 2018-10-27 NOTE — ED PROVIDER NOTES
[place] EMERGENCY DEPARTMENT ([facility])  October 27, 2018    History     Chief Complaint   Patient presents with     Musculoskeletal Problem     inflammation per pt     HPI  Geovany Sarmiento is a 43 year old male who presents to the ED with ongoing generalized myalgias and bilateral upper extremity weakness.  Patient states his symptoms started, 3 days ago when he suddenly had diffuse generalized myalgias.  He describes his myalgias as feeling sore as if he had run a half marathon which he has done in the past.  He also states that he has been having numbness in his bilateral upper extremities and cannot hold his 6-month-old daughter, poor 1/2 gallon of milk comfortably, or hold for dinner plates.  He states he also feels like his bilateral lower extremities are also weak whenever he puts a load on them, such as going up the stairs while holding his infant daughter.  Patient reports that he was seen at urgent care yesterday for the same symptoms and was discharged with prescription for naproxen, but has had no good relief with naproxen or taking home ibuprofen.  He also complains of some neck stiffness but denies any neck pain.  He otherwise currently denies any fevers, vomiting, neck pain, rashes, recent travel, abdominal pain, diarrhea, headache, vision changes, eye pain,  rhinorrhea, cough, diaphoresis, changes in his mouth, numbness or tingling, difficulty swallowing, loss of appetite, or facial pain.    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      Per chart review, he was seen in urgent care for evaluation of myalgias and arthralgias.Labs were largely unremarkable besides CRP of 14.1.      PAST MEDICAL HISTORY  Past Medical History:   Diagnosis Date     Cyst near tailbone      PAST SURGICAL HISTORY  History reviewed. No pertinent surgical history.  FAMILY HISTORY  No family history on file.  SOCIAL HISTORY  Social History  "  Substance Use Topics     Smoking status: Never Smoker     Smokeless tobacco: Never Used     Alcohol use Yes      Comment: 2-3 drinks/wk     MEDICATIONS  Current Facility-Administered Medications   Medication     0.9% sodium chloride BOLUS    Followed by     sodium chloride 0.9% infusion     ketorolac (TORADOL) injection 30 mg     Current Outpatient Prescriptions   Medication     naproxen (NAPROSYN) 500 MG tablet     cyclobenzaprine (FLEXERIL) 10 MG tablet     ALLERGIES  Allergies   Allergen Reactions     Contrast Dye Hives       I have reviewed the Medications, Allergies, Past Medical and Surgical History, and Social History in the Epic system.    Review of Systems   Constitutional: Negative for appetite change, diaphoresis and fever.   HENT: Negative for rhinorrhea and trouble swallowing.    Eyes: Negative for pain and visual disturbance.   Respiratory: Negative for cough.    Gastrointestinal: Negative for abdominal pain, diarrhea and vomiting.   Musculoskeletal: Positive for myalgias (diffuse generalized) and neck stiffness. Negative for neck pain.   Skin: Negative for rash.   Neurological: Positive for weakness (bilateral upper extremities). Negative for numbness and headaches.   All other systems reviewed and are negative.      Physical Exam   BP: 129/76  Pulse: 71  Temp: 98.5  F (36.9  C)  Resp: 16  Height: 175.3 cm (5' 9\")  Weight: 84.5 kg (186 lb 3.2 oz)  SpO2: 99 %      Physical Exam  Physical Exam   Constitutional: oriented to person, place, and time. appears well-developed and well-nourished.   HENT:   Head: Normocephalic and atraumatic.   Neck: Normal range of motion.   No tenderness palpation over the C-spine.  Pulmonary/Chest: Effort normal. No respiratory distress.   Cardiac: No murmurs, rubs, gallops. RRR.  Abdominal: Abdomen soft, nontender, nondistended. No rebound tenderness.  MSK: Long bones without deformity or evidence of trauma  Neurological: alert and oriented to person, place, and time.   " Extraocular muscles intact.  Cranial nerves II through XII intact.  Next strength 5 out of 5.  Upper extremity strength 3 out of 5, sensation grossly intact in the upper extremities.  Lower extremity strength is 5 out of 5.  Sensation grossly intact in lower extremities.  Patient dramatically weaker in the biceps and bilateral  strength, also weak in the deltoids.  Gait intact.  Pupils are 4 mm reactive to light.  Skin: Skin is warm and dry.   Psychiatric:  normal mood and affect.  behavior is normal. Thought content normal.     ED Course     ED Course     Procedures   4:01 PM  The patient was seen and examined by  in Room 8    Results for orders placed or performed during the hospital encounter of 10/27/18   CBC with platelets differential   Result Value Ref Range    WBC 4.9 4.0 - 11.0 10e9/L    RBC Count 5.04 4.4 - 5.9 10e12/L    Hemoglobin 16.1 13.3 - 17.7 g/dL    Hematocrit 46.2 40.0 - 53.0 %    MCV 92 78 - 100 fl    MCH 31.9 26.5 - 33.0 pg    MCHC 34.8 31.5 - 36.5 g/dL    RDW 12.7 10.0 - 15.0 %    Platelet Count 209 150 - 450 10e9/L    Diff Method Automated Method     % Neutrophils 62.9 %    % Lymphocytes 26.4 %    % Monocytes 8.0 %    % Eosinophils 2.3 %    % Basophils 0.4 %    % Immature Granulocytes 0.0 %    Nucleated RBCs 0 0 /100    Absolute Neutrophil 3.1 1.6 - 8.3 10e9/L    Absolute Lymphocytes 1.3 0.8 - 5.3 10e9/L    Absolute Monocytes 0.4 0.0 - 1.3 10e9/L    Absolute Eosinophils 0.1 0.0 - 0.7 10e9/L    Absolute Basophils 0.0 0.0 - 0.2 10e9/L    Abs Immature Granulocytes 0.0 0 - 0.4 10e9/L    Absolute Nucleated RBC 0.0    Comprehensive metabolic panel   Result Value Ref Range    Sodium 141 133 - 144 mmol/L    Potassium 3.8 3.4 - 5.3 mmol/L    Chloride 106 94 - 109 mmol/L    Carbon Dioxide 26 20 - 32 mmol/L    Anion Gap 8 3 - 14 mmol/L    Glucose 103 (H) 70 - 99 mg/dL    Urea Nitrogen 24 7 - 30 mg/dL    Creatinine 0.83 0.66 - 1.25 mg/dL    GFR Estimate >90 >60 mL/min/1.7m2    GFR Estimate If  Black >90 >60 mL/min/1.7m2    Calcium 8.3 (L) 8.5 - 10.1 mg/dL    Bilirubin Total 0.4 0.2 - 1.3 mg/dL    Albumin 4.0 3.4 - 5.0 g/dL    Protein Total 7.9 6.8 - 8.8 g/dL    Alkaline Phosphatase 56 40 - 150 U/L    ALT 22 0 - 70 U/L    AST 34 0 - 45 U/L   CK total   Result Value Ref Range    CK Total 474 (H) 30 - 300 U/L   CRP inflammation   Result Value Ref Range    CRP Inflammation 6.4 0.0 - 8.0 mg/L         Assessments & Plan (with Medical Decision Making)   MDM  Patient presenting with generalized pain and weakness more in the upper extremities.  I do question a central spinal cord process such as a syringoma gigi CT negative yesterday, unlikely rhabdomyolysis.. May be due to rheumatoid arthritis or other rheumatologic condition however inflammatory markers were unremarkable yesterday.  Patient is dramatically weaker in the upper extremities.  Sensation is grossly intact.  No significant tenderness when palpating the joints, range of motion good in all of his joints.  Will discuss with neurology, repeat labs and give Toradol for pain.    Re eval: Neurology has a consideration for Guillain-Barré syndrome, they would like an MRI and LP to be done.  Will attempt to get a lumbar puncture here in addition to the MRI.  Patient be admitted to neurology service for further workup.    I have reviewed the nursing notes.    I have reviewed the findings, diagnosis, plan and need for follow up with the patient.    New Prescriptions    No medications on file       Final diagnoses:   Weakness     I, Taz Martins, am serving as a trained medical scribe to document services personally performed by Juan José Fernandes MD, based on the provider's statements to me.      Juan José MARTINEZ MD, was physically present and have reviewed and verified the accuracy of this note documented by Taz Martins.     10/27/2018   [facility], [place], EMERGENCY DEPARTMENT     Juan José Fernandes MD  10/27/18 1305

## 2018-10-27 NOTE — ED TRIAGE NOTES
Pt comes in with c/o generalized muscle pain/inflammation that started suddenly on Wed. He was unable to function as normal d/t this discomfort. Pt was seen in UC yesterday and given Naproxen but not having any relief.

## 2018-10-27 NOTE — IP AVS SNAPSHOT
[address] [address]    Phone:  [phone]                                       After Visit Summary   10/27/2018    Geovany Sarmiento    MRN: [id number]           After Visit Summary Signature Page     I have received my discharge instructions, and my questions have been answered. I have discussed any challenges I see with this plan with the nurse or doctor.    ..........................................................................................................................................  Patient/Patient Representative Signature      ..........................................................................................................................................  Patient Representative Print Name and Relationship to Patient    ..................................................               ................................................  Date                                   Time    ..........................................................................................................................................  Reviewed by Signature/Title    ...................................................              ..............................................  Date                                               Time          22EPIC Rev 08/18

## 2018-10-28 ENCOUNTER — APPOINTMENT (OUTPATIENT)
Dept: MRI IMAGING | Facility: CLINIC | Age: 43
DRG: 558 | End: 2018-10-28
Payer: COMMERCIAL

## 2018-10-28 LAB
ANION GAP SERPL CALCULATED.3IONS-SCNC: 6 MMOL/L (ref 3–14)
BASOPHILS # BLD AUTO: 0 10E9/L (ref 0–0.2)
BASOPHILS NFR BLD AUTO: 0.5 %
BUN SERPL-MCNC: 18 MG/DL (ref 7–30)
CALCIUM SERPL-MCNC: 7.8 MG/DL (ref 8.5–10.1)
CHLORIDE SERPL-SCNC: 112 MMOL/L (ref 94–109)
CK SERPL-CCNC: 302 U/L (ref 30–300)
CO2 SERPL-SCNC: 24 MMOL/L (ref 20–32)
CREAT SERPL-MCNC: 0.75 MG/DL (ref 0.66–1.25)
DIFFERENTIAL METHOD BLD: NORMAL
EOSINOPHIL # BLD AUTO: 0.1 10E9/L (ref 0–0.7)
EOSINOPHIL NFR BLD AUTO: 2.1 %
ERYTHROCYTE [DISTWIDTH] IN BLOOD BY AUTOMATED COUNT: 12.7 % (ref 10–15)
GFR SERPL CREATININE-BSD FRML MDRD: >90 ML/MIN/1.7M2
GLUCOSE SERPL-MCNC: 87 MG/DL (ref 70–99)
HCT VFR BLD AUTO: 40.1 % (ref 40–53)
HGB BLD-MCNC: 13.3 G/DL (ref 13.3–17.7)
IMM GRANULOCYTES # BLD: 0 10E9/L (ref 0–0.4)
IMM GRANULOCYTES NFR BLD: 0 %
LYMPHOCYTES # BLD AUTO: 1.4 10E9/L (ref 0.8–5.3)
LYMPHOCYTES NFR BLD AUTO: 36.5 %
MCH RBC QN AUTO: 31 PG (ref 26.5–33)
MCHC RBC AUTO-ENTMCNC: 33.2 G/DL (ref 31.5–36.5)
MCV RBC AUTO: 94 FL (ref 78–100)
MONOCYTES # BLD AUTO: 0.3 10E9/L (ref 0–1.3)
MONOCYTES NFR BLD AUTO: 8.7 %
NEUTROPHILS # BLD AUTO: 2 10E9/L (ref 1.6–8.3)
NEUTROPHILS NFR BLD AUTO: 52.2 %
NRBC # BLD AUTO: 0 10*3/UL
NRBC BLD AUTO-RTO: 0 /100
PLATELET # BLD AUTO: 190 10E9/L (ref 150–450)
PLATELET # BLD EST: NORMAL 10*3/UL
POTASSIUM SERPL-SCNC: 4.2 MMOL/L (ref 3.4–5.3)
RBC # BLD AUTO: 4.29 10E12/L (ref 4.4–5.9)
RETICS # AUTO: 35.6 10E9/L (ref 25–95)
RETICS/RBC NFR AUTO: 0.8 % (ref 0.5–2)
SODIUM SERPL-SCNC: 142 MMOL/L (ref 133–144)
T PALLIDUM AB SER QL: NONREACTIVE
WBC # BLD AUTO: 3.9 10E9/L (ref 4–11)

## 2018-10-28 PROCEDURE — 12000001 ZZH R&B MED SURG/OB UMMC

## 2018-10-28 PROCEDURE — 82550 ASSAY OF CK (CPK): CPT | Performed by: STUDENT IN AN ORGANIZED HEALTH CARE EDUCATION/TRAINING PROGRAM

## 2018-10-28 PROCEDURE — 83516 IMMUNOASSAY NONANTIBODY: CPT | Performed by: STUDENT IN AN ORGANIZED HEALTH CARE EDUCATION/TRAINING PROGRAM

## 2018-10-28 PROCEDURE — 83655 ASSAY OF LEAD: CPT | Performed by: STUDENT IN AN ORGANIZED HEALTH CARE EDUCATION/TRAINING PROGRAM

## 2018-10-28 PROCEDURE — 83825 ASSAY OF MERCURY: CPT | Performed by: STUDENT IN AN ORGANIZED HEALTH CARE EDUCATION/TRAINING PROGRAM

## 2018-10-28 PROCEDURE — 94150 VITAL CAPACITY TEST: CPT

## 2018-10-28 PROCEDURE — 73218 MRI UPPER EXTREMITY W/O DYE: CPT | Mod: LT

## 2018-10-28 PROCEDURE — 80048 BASIC METABOLIC PNL TOTAL CA: CPT | Performed by: STUDENT IN AN ORGANIZED HEALTH CARE EDUCATION/TRAINING PROGRAM

## 2018-10-28 PROCEDURE — 40000611 ZZHCL STATISTIC MORPHOLOGY W/INTERP HEMEPATH TC 85060: Performed by: STUDENT IN AN ORGANIZED HEALTH CARE EDUCATION/TRAINING PROGRAM

## 2018-10-28 PROCEDURE — 86235 NUCLEAR ANTIGEN ANTIBODY: CPT | Performed by: STUDENT IN AN ORGANIZED HEALTH CARE EDUCATION/TRAINING PROGRAM

## 2018-10-28 PROCEDURE — 85027 COMPLETE CBC AUTOMATED: CPT | Performed by: STUDENT IN AN ORGANIZED HEALTH CARE EDUCATION/TRAINING PROGRAM

## 2018-10-28 PROCEDURE — 25000128 H RX IP 250 OP 636: Performed by: STUDENT IN AN ORGANIZED HEALTH CARE EDUCATION/TRAINING PROGRAM

## 2018-10-28 PROCEDURE — 85004 AUTOMATED DIFF WBC COUNT: CPT | Performed by: STUDENT IN AN ORGANIZED HEALTH CARE EDUCATION/TRAINING PROGRAM

## 2018-10-28 PROCEDURE — 40000275 ZZH STATISTIC RCP TIME EA 10 MIN

## 2018-10-28 PROCEDURE — 36415 COLL VENOUS BLD VENIPUNCTURE: CPT | Performed by: STUDENT IN AN ORGANIZED HEALTH CARE EDUCATION/TRAINING PROGRAM

## 2018-10-28 PROCEDURE — 85045 AUTOMATED RETICULOCYTE COUNT: CPT | Performed by: STUDENT IN AN ORGANIZED HEALTH CARE EDUCATION/TRAINING PROGRAM

## 2018-10-28 PROCEDURE — 25000128 H RX IP 250 OP 636: Performed by: EMERGENCY MEDICINE

## 2018-10-28 PROCEDURE — 82175 ASSAY OF ARSENIC: CPT | Performed by: STUDENT IN AN ORGANIZED HEALTH CARE EDUCATION/TRAINING PROGRAM

## 2018-10-28 RX ORDER — DIPHENHYDRAMINE HYDROCHLORIDE 50 MG/ML
25 INJECTION INTRAMUSCULAR; INTRAVENOUS EVERY 6 HOURS PRN
Status: DISCONTINUED | OUTPATIENT
Start: 2018-10-28 | End: 2018-10-29 | Stop reason: HOSPADM

## 2018-10-28 RX ADMIN — DIPHENHYDRAMINE HYDROCHLORIDE 25 MG: 50 INJECTION, SOLUTION INTRAMUSCULAR; INTRAVENOUS at 00:17

## 2018-10-28 RX ADMIN — SODIUM CHLORIDE 1000 ML: 9 INJECTION, SOLUTION INTRAVENOUS at 05:50

## 2018-10-28 ASSESSMENT — ACTIVITIES OF DAILY LIVING (ADL)
ADLS_ACUITY_SCORE: 9

## 2018-10-28 NOTE — PLAN OF CARE
Problem: Pain, Acute (Adult)  Goal: Identify Related Risk Factors and Signs and Symptoms  Related risk factors and signs and symptoms are identified upon initiation of Human Response Clinical Practice Guideline (CPG).   Outcome: No Change  Received pt from ED after getting report for ED ANSELMO Pereira. Pt here with muscular pain and weakness in BUE. AVSS. Neuros intact except moderate grasps, slightly weaker BUE, and pt states pain when he squeezes his arms; stated that after sleeping he would awaken with tingling in left leg but would go away when moving it (stated that this is his baseline at times at home). Pain well controlled with PRN Tylenol overnight. RN noted hives on pt at 0000 that pt had hives and welts all over body (pt has allergy to contrast - he thought that he did not have an allergy to the type used in MRI) -  updated and IV Benadryl given - Hives have now resolved. Regular diet - with fair PO - had courtesy meal. MIVF at 125ml/hr. VDSP. Up with SBA. Pt has not had flu shot - on charge list to check with neurology if pt is appropriate to have flu shot. Will continue to monitor and follow with POC.

## 2018-10-28 NOTE — PROVIDER NOTIFICATION
Noted when doing patient's 0000 neuro check that pt had hives/ welts all over his body; pt stated that he did feel itchy; denied SOB, Oxygen saturation at 98%. Dr. Turner updated stated to monitor pt and ordered IV Benadryl 25mg. Benadryl given and pt placed on continuous pulse oximeter - will continue to monitor.

## 2018-10-28 NOTE — PROGRESS NOTES
"[facility], [place]   Neurology Daily Note    Geovany Sarmiento  [id number]  10/28/2018    Subjective Data: Mr. Sarmiento is doing well this morning.  He denies new neurologic complaints or worsening of his weakness.  He denies ongoing sensory symptoms, but noted his left leg felt \"numb\" last night.  After moving the leg, the symptoms resolved.  He otherwise denies vision changes, speech and language problems, swallowing problems, urine retention, and sensory deficits.  His weakness has not affected his ability to ambulate.  He has never had symptoms like this before and denies family history of neuromuscular disease.  He has had no recent illness or vaccinations.    Objective Data:   /62  Pulse 56  Temp 97.2  F (36.2  C) (Oral)  Resp 14  Ht 1.753 m (5' 9\")  Wt 83.1 kg (183 lb 3.2 oz)  SpO2 98%  BMI 27.05 kg/m2  SpO2 data collected on RA     Neurologic:  - alert and oriented to person, follows commands  - visual fields intact by confrontation; pupils 4 mm symmetric, round and reactive to light; EOMI without nystagmus  - no facial asymmetry; tongue movements full; palate rise symmetric; no dysarthria  - sensation intact to light touch throughout  - muscle tone and bulk symmetric and appropriate; strength 5/5 in lower extremities, 4+/5 shoulder abduction, elbow flexion, and elbow extension bilaterally, 3/5 wrist extension, finger extension, finger flexion, finger abduction, and thumb adduction bilaterally  - DTR 2+ and symmetric throughout; no clonus; plantar reflex down bilaterally  - finger-nose-finger intact BL    Constitutional: NAD, WDWN, cooperative with examination and interview  Psych: insight and affect appropriate to circumstance  Cardiovascular: regular rhythm without murmurs  Respiratory: clear to auscultation without wheezes or rales  Chest: symmetric chest rise, no accessory muscle use  Abdominal: BS normal active x 4  Extremities: no clubbing of digits, no pitting " "edema  Skin: No lesions or rashes    Labs:  Sodium 142, potassium 4.2, urea nitrogen 18, creatinine 0.75  White blood count 3.9, hemoglobin 13.3, platelets 190    CK down to 302 from 474 on admission    Imaging: Impression copied from radiologist report  MRI Cervical spine:  \"Impression:  1. Mild bilateral neuroforaminal narrowing at the level of C5-C6.  2. Cervical spinal cord demonstrate normal signal without abnormal  Enhancement.\"    Assessment and Plan:  Mr. Sarmiento is a previously healthy 43-year-old gentleman currently admitted to the neurology service for evaluation and management of acute onset myalgia and weakness, most notable in the distal upper extremities.  Workup thus far has shown mild increase in CK.  There are no sensory symptoms or exam findings suggestive of myelopathy.  Low suspicion of GBS-like syndrome.  Inflammatory versus immune myositis is in the working differential.  Plan for forearm MRI today.  Exam is stable, therefore will defer empiric treatment pending further workup.  Will likely need EMG in a week or so.    #weakness:  -Left forearm MRI with contrast  - Acetaminophen as needed for pain  - Follow-up myositis panel  -Follow-up heavy metal panel, zinc, copper, arsenic  -Follow-up peripheral blood smear  - EMG next week, can be done outpatient  -PT/OT  -check NIF/FVC    FEN: Electrolytes replaced PRN, regular diet, stop IVF today  Prophylaxis: SCD  Code Status: FULL CODE    Dispo: Anticipate discharge home in 1-2 days pending results of workup and clinical stability.    This patient was seen and discussed with attending neurologist, Dr. Guillaume Tapia DO  PGY4 Neurology   Neurology Service  10/28/2018      I saw and evaluated the patient on 10/28/2018 and agree with the findings and the plan of care as documented in the resident's note.      Please see attestation on H&P from this writer dated today for full attestation.     Jos Galaviz DO   of " Neurology

## 2018-10-28 NOTE — PLAN OF CARE
Problem: Pain, Acute (Adult)  Goal: Identify Related Risk Factors and Signs and Symptoms  Related risk factors and signs and symptoms are identified upon initiation of Human Response Clinical Practice Guideline (CPG).   Outcome: No Change  VSS. Denies pain. NEuros unchanged; intact except bilateral moderate hand grasp. Good po intake. Voiding spontaneously, had BM yesterday. Up indep in room and halls, denies being tired or dizzy when ambulating. Wife at bedside. Plan is for Arm MRI this afternoon, checklist ok to use from MRI last night.

## 2018-10-28 NOTE — ED NOTES
[facility], [place]   ED Nurse to Floor Handoff     Geovany Sarmiento is a 43 year old male who speaks English and lives with family members,  in a home  They arrived in the ED by car from home    ED Chief Complaint: Musculoskeletal Problem (inflammation per pt)    ED Dx;   Final diagnoses:   Weakness         Needed?: No    Allergies:   Allergies   Allergen Reactions     Contrast Dye Hives   .  Past Medical Hx:   Past Medical History:   Diagnosis Date     Cyst near tailbone       Baseline Mental status: WDL  Current Mental Status changes: at basesline    Infection present or suspected this encounter: no  Sepsis suspected: No  Isolation type: No active isolations     Activity level - Baseline/Home:  Independent  Activity Level - Current:   Stand with Assist    Bariatric equipment needed?: No    In the ED these meds were given:   Medications   0.9% sodium chloride BOLUS (0 mLs Intravenous Stopped 10/27/18 1814)     Followed by   sodium chloride 0.9% infusion (not administered)   lidocaine 1 % injection (not administered)   ketorolac (TORADOL) injection 30 mg (30 mg Intravenous Given 10/27/18 1652)       Drips running?  No    Home pump  No    Current LDAs  Peripheral IV 10/27/18 Right Upper forearm (Active)   Site Assessment WDL 10/27/2018  4:51 PM   Line Status Infusing 10/27/2018  4:51 PM   Phlebitis Scale 0-->no symptoms 10/27/2018  4:51 PM   Number of days:0       Labs results:   Labs Ordered and Resulted from Time of ED Arrival Up to the Time of Departure from the ED   COMPREHENSIVE METABOLIC PANEL - Abnormal; Notable for the following:        Result Value    Glucose 103 (*)     Calcium 8.3 (*)     All other components within normal limits   CK TOTAL - Abnormal; Notable for the following:     CK Total 474 (*)     All other components within normal limits   CBC WITH PLATELETS DIFFERENTIAL   CRP INFLAMMATION   ERYTHROCYTE SEDIMENTATION RATE AUTO   TREPONEMA ABS W REFLEX TO RPR AND  "TITER   VITAMIN B12   PROTEIN ELECTROPHORESIS   FOLATE   COPPER LEVEL   ZINC   LYME DISEASE DNA DETECTION BY PCR   WEST NILE VIRUS RNA BY PCR   GLUCOSE CSF   PROTEIN TOTAL CSF   GRAM STAIN   CSF CULTURE AEROBIC BACTERIAL   CELL COUNT WITH DIFFERENTIAL CSF   ENTEROVIRUS PCR CSF   WEST NILE VIRUS IGG AND IGM CSF       Imaging Studies: No results found for this or any previous visit (from the past 24 hour(s)).    Recent vital signs:   /76  Pulse 71  Temp 98.5  F (36.9  C) (Oral)  Resp 16  Ht 1.753 m (5' 9\")  Wt 84.5 kg (186 lb 3.2 oz)  SpO2 99%  BMI 27.5 kg/m2    Cardiac Rhythm: Normal Sinus  Pt needs tele? No  Skin/wound Issues: None    Code Status: Full Code    Pain control: good    Nausea control: good    Abnormal labs/tests/findings requiring intervention: See Epic    Family present during ED course? Yes   Family Comments/Social Situation comments: Wife present.  Nitza: Cell [phone]     Tasks needing completion: None    Kelli Rebolledo RN  [facility]--   [phone] [place] ED  [phone] [facility] ED      "

## 2018-10-29 ENCOUNTER — APPOINTMENT (OUTPATIENT)
Dept: OCCUPATIONAL THERAPY | Facility: CLINIC | Age: 43
DRG: 558 | End: 2018-10-29
Payer: COMMERCIAL

## 2018-10-29 VITALS
SYSTOLIC BLOOD PRESSURE: 127 MMHG | WEIGHT: 183.2 LBS | HEART RATE: 54 BPM | TEMPERATURE: 96.9 F | OXYGEN SATURATION: 99 % | BODY MASS INDEX: 27.13 KG/M2 | DIASTOLIC BLOOD PRESSURE: 76 MMHG | RESPIRATION RATE: 16 BRPM | HEIGHT: 69 IN

## 2018-10-29 LAB
ANA PAT SER IF-IMP: ABNORMAL
ANA SER QL IF: ABNORMAL
ANA TITR SER IF: ABNORMAL {TITER}
B BURGDOR IGG+IGM SER QL: 0.17 (ref 0–0.89)
CCP AB SER IA-ACNC: 1 U/ML
COPATH REPORT: NORMAL
RHEUMATOID FACT SER NEPH-ACNC: <20 IU/ML (ref 0–20)

## 2018-10-29 PROCEDURE — 97535 SELF CARE MNGMENT TRAINING: CPT | Mod: GO | Performed by: OCCUPATIONAL THERAPIST

## 2018-10-29 PROCEDURE — 97165 OT EVAL LOW COMPLEX 30 MIN: CPT | Mod: GO | Performed by: OCCUPATIONAL THERAPIST

## 2018-10-29 PROCEDURE — 97110 THERAPEUTIC EXERCISES: CPT | Mod: GO | Performed by: OCCUPATIONAL THERAPIST

## 2018-10-29 PROCEDURE — 40000133 ZZH STATISTIC OT WARD VISIT: Performed by: OCCUPATIONAL THERAPIST

## 2018-10-29 ASSESSMENT — ACTIVITIES OF DAILY LIVING (ADL)
ADLS_ACUITY_SCORE: 11
ADLS_ACUITY_SCORE: 11
PREVIOUS_RESPONSIBILITIES: MEAL PREP;HOUSEKEEPING;LAUNDRY;SHOPPING;YARDWORK;MEDICATION MANAGEMENT;FINANCES;DRIVING;WORK;CHILD CARE
ADLS_ACUITY_SCORE: 11
ADLS_ACUITY_SCORE: 9

## 2018-10-29 NOTE — DISCHARGE SUMMARY
[facility], [place]    Neurology Discharge Summary    Date of Admission: 10/27/2018  Date of Discharge: October 29, 2018    Disposition: Discharged to home  Primary Care Physician: No primary care provider on file.     Admission Diagnosis:   Myalgias and upper extremity weakness     Discharge Diagnosis:   Suspected viral myositis     Consults:  None    Problem Leading to Hospitalization (from HPI):   43 year old male with no significant PMH who presents with diffuse myalgias and distal upper extremity weakness onset over Wednesday night (~3 days ago). He describes that he is in good health prior to onset of diffuse muscle pains on Wednesday night which developed in the evening. He tried to wake up and go to work on Thursday morning but he had significant myalgias and he left work to go home. He slept most of Thursday and on Friday woke up and noted that his hands were substantially weak he had trouble gripping a milk jug, writing his name, or carrying plates. Fine motor skills in his hands are also reduced. He denies sensory symptoms of numbness/tingling. He went to primary care where CK was notably normal, CRP was mildly elevated at 14.1 and the remainder his labs were unremarkable.  He continued to have substantial weakness which brought him to the [place] emergency department today.  He does not think his symptoms have progressed substantially since onset.  His wife questions this because he slept and did not work very much on Thursday. He endorse a sore throat with onset at the same time as myalgias.  He notes that his 5-month-old daughter was sick about a week ago, denies prior illness in the past 1-2 weeks. He endorses anorexia Wednesday and Thursday which improved the subsequent days. He has had no fever, no fatigue, no diarrhea, or headache. He denies any constipation or urinary retention.  He has no point tenderness neck or back pain but substantial paraspinal muscle pain.  He  "denies chiropractic manipulations, extraneous activities, or self cracks of his neck. He has been unpacking boxes but he does not think that this is been particularly challenge for him. He also picks up his children 5 months and 3 years old with the heaviest being 29 pounds. He has been taking significant amounts of ibuprofen 800 mg at least 2 times a day without substantial relief. He also has taken naproxen that was prescribed by his primary care but did not start a muscle relaxer prescribed at that visit.  He denies tick bites, vaccinations, or rashes. No recent camping, but he did take a hayride recently. Cannot think of any other out of the ordinary activities.      He works as a  and he works with many compounds including zinc, cyanide, and nickel to name a few.     Please see H&P dated 10/27/2018 for further details about presentation.    Brief Hospital Course:   He was admitted with concern for acute myositis versus possible atypical AIDP. He had significant improvement in his strength and myalgias over the course of this admission with substantial improvements on each day of admission. He was found to have peak CRP 14.1 from 10/26/18 and peak  10/27/18 with normalization and improvement respectively on subsequent testing. Cervical MRI with and without contrast was unremarkable for cord changes or significant stenosis.  MRI forearm showed \"very subtle\" patchy edema, but no infiltrative process. TSH, RF, B12, CCP IgG, ESR, lyme and RPR were negative. JUANITO returned borderline positive with JUANITO titer 1:40. At time of discharge, polymyositis/dermatomyositis, heavy metal panel, ELP, zinc and copper were pending. Given his rapid onset and improvement, we suspect a viral myositis in the setting of sick daughter the week prior and pharyngitis coinciding with myalgias. He was seen by OT who felt he was safe for discharge home, a referral for OT outpatient was made. He was instructed to return if symptoms " worsen and he will follow-up in two weeks with Dr. Castro.     PERTINENT INVESTIGATIONS    Data     CMP     Recent Labs  Lab 10/28/18  0747 10/27/18  1653 10/26/18  1518    141 139   POTASSIUM 4.2 3.8 3.7   CHLORIDE 112* 106 106   CO2 24 26 26   ANIONGAP 6 8 7   GLC 87 103* 117*   BUN 18 24 17   CR 0.75 0.83 0.92   GFRESTIMATED >90 >90 90   GFRESTBLACK >90 >90 >90   PABLO 7.8* 8.3* 8.4*   PROTTOTAL  --  7.9 7.6   ALBUMIN  --  4.0 3.7   BILITOTAL  --  0.4 0.5   ALKPHOS  --  56 50   AST  --  34 18   ALT  --  22 18        CBC     Recent Labs  Lab 10/28/18  0747 10/27/18  1653 10/26/18  1518   WBC 3.9* 4.9 4.3   RBC 4.29* 5.04 4.99   HGB 13.3 16.1 15.6   HCT 40.1 46.2 45.0   MCV 94 92 90   MCH 31.0 31.9 31.3   MCHC 33.2 34.8 34.7   RDW 12.7 12.7 12.7    209 205     Micro     Recent Labs  Lab 10/26/18  1516   SDES Throat  Throat   CULT No beta hemolytic Streptococcus Group A isolated        Radiological Data  Data   Recent Results (from the past 48 hour(s))   MR Cervical Spine w/o & w Contrast    Narrative    MR CERVICAL SPINE W/O & W CONTRAST 10/27/2018 8:08 PM    Provided History: upper extr weakness;     Comparison: None    Technique: Sagittal T1-weighted, sagittal T2-weighted, sagittal STIR,  sagittal diffusion weighted, axial T2-weighted, and axial T2* gradient  echo images of the cervical spine were obtained without intravenous  contrast.    Findings:  The cervical vertebrae are normally aligned.  There is no disc height  narrowing at any level.  There is normal signal within and normal  contour of the cervical spinal cord.  The findings on a level by level  basis are as follows:    C2-3: No spinal canal or neural foraminal stenosis.    C3-4:  No spinal canal or neural foraminal stenosis    C4-5:  No spinal canal or neural foraminal stenosis.    C5-6:  Mild bilateral neural foraminal stenosis. No spinal canal  narrowing.    C6-7:  No spinal canal or neural foraminal  stenosis.    C7-T1:  No spinal canal  or neural foraminal stenosis.     No abnormality of the paraspinous soft tissues.      Impression    Impression:  1. Mild bilateral neuroforaminal narrowing at the level of C5-C6.  2. Cervical spinal cord demonstrate normal signal without abnormal  enhancement.    I have personally reviewed the examination and initial interpretation  and I agree with the findings.    SHANEL CAMARA MD   MR Forearm Left w/o Contrast    Narrative    Exam: MRI of the left forearm dated 10/28/2018.    COMPARISON: None.    CLINICAL HISTORY: Muscle pain with elevated CK, suspect acute  myopathy.    TECHNIQUE: Multiplanar, multisequence MR imaging of the left forearm  was obtained using standard sequences in 3 orthogonal planes without  the use of intravenous or intra-articular gadolinium contrast.    FINDINGS:    No marrow signal abnormalities to suggest fracture, osteonecrosis, or  marrow infiltration. No stress changes noted.    The muscle bulk is intact without significant atrophy or fatty  infiltration. Scattered patches areas of soft tissue muscle edema  within the forearm, seen within the flexor and extensor compartment.  No soft tissue masses or fluid collections are noted.    No significant soft tissue edema within the subcutaneous tissues.    Mild tenosynovitis surrounding the flexor carpi radialis tendon , and  the extensor tendons of the second extensor compartment and minimally  surrounding the extensor tendons of the fourth compartment.      Impression    IMPRESSION:  1. Very subtle scattered patchy areas of muscle edema in the left  forearm, greatest within the superficial extensor compartment muscles,  for example series 101 image 47.  1. No significant muscle atrophy or fatty infiltration.  2. No soft tissue masses or fluid collections.  3. No marrow signal abnormalities to suggest fracture, osteonecrosis,  or marrow infiltration.    MARTELL ROBLES MD        PHYSICAL EXAMINATION  Constitutional: NAD  Head: atraumatic,  anicteric.   Eyes: see neuroexam  CVC: RRR  Lung: No respiratory distress.   Abdomen: soft, nontender, nondistended  Extremities: No edema. Myalgias substantially improved.   Psychiatric: Good mood. Affect congruent. Cooperative. Thought processes linear.     Neurologic:   Mental status - Alert, oriented. Speech fluent and comprehension intact.   Cranial nerves - Visual fields intact, PERRL, EOMI, face symmetric, facial sensation intact, hearing intact to voice, tongue midline, symmetric elevation of palate/uvula.   Strength - Intact strength in bilateral upper and lower extremities except for distal uppers - wrist extensors 4+/5, finger abductors 4/5 and  4/5.   Sensation - Intact to light touch, vibration, and pinprick in all four extremities   Reflexes - 2+ bilateral uppers, 3+ bilateral patellar, right achilles 1+ and left unable to elicit. Toes downgoing   Coordination - Intact FNF     Additional recommendations and follow up:       Review of your medicines      CONTINUE these medicines which have NOT CHANGED       Dose / Directions    naproxen 500 MG tablet   Commonly known as:  NAPROSYN   Used for:  Myalgia, Arthralgia, unspecified joint        Dose:  500 mg   Take 1 tablet (500 mg) by mouth 2 times daily as needed for moderate pain   Quantity:  30 tablet   Refills:  1         STOP taking          cyclobenzaprine 10 MG tablet   Commonly known as:  FLEXERIL                 Neurology Adult Referral     Occupational Therapy Referral     Reason for your hospital stay   You were admitted for weakness and myalgias affecting your upper extremities predominately. You have improved substantially over the course of your hospitalization and we think you likely had a viral myositis.     You should take the rest of the week off of work, if possible.     We recommend you follow-up with outpatient neurology in ~2 weeks with Dr. Castro to ensure complete resolution of your symptoms. If your symptoms worsen in the next few  days, we would recommend that you return to the emergency department for additional care/evaluation.     Use naproxen/iburopfen as needed for muscle pains.     Adult [facility] Follow-up and recommended labs and tests   Follow up with neurology in 2 weeks, referral made.       Appointments on [place] and/or [facility] (with [facility] or [facility] provider or service). Call [phone] if you haven't heard regarding these appointments within 7 days of discharge.     Activity   Your activity upon discharge: activity as tolerated     Discharge Instructions   As above     Full Code     Diet   Follow this diet upon discharge: Orders Placed This Encounter     Combination Diet Regular Diet Adult       Patient was seen and discussed with attending physician Dr. Castro.    Samina Michele  PGY2 Neurology  [phone]    ATTENDING 10/29/18: Patient seen and examined. W/U available to date reviewed with patient and wife. He has improved. Pain largely resolved. Some mild hand weakness. Explained likely viral myositis. Discharge plan discussed and I can see him as outpatient for follow up. Agree with Dr Michele's Summary

## 2018-10-29 NOTE — PLAN OF CARE
Problem: Pain, Acute (Adult)  Goal: Identify Related Risk Factors and Signs and Symptoms  Related risk factors and signs and symptoms are identified upon initiation of Human Response Clinical Practice Guideline (CPG).   Outcome: No Change  Status: pt admitted to 6a for diffuse myalgias and distal upper extremity weakness   VS: VSS on RA  Neuros: Alert and orientated x4, denies numbness/tingling, pt states slight weakness in upper extremities, bilateral moderate hand grasp, had an MRI of arm this afternoon 10/28   GI: Regular diet, BM 10/28  : Voiding spontaneously without difficulty   IV: PIV SL   Activity: Up independently   Pain: Denied pain throughout shift   Respiratory: WNL   Skin: Intact   Social: No family or friends present this shift     Plan of care: Continue to monitor for pain, continue with POC

## 2018-10-29 NOTE — PLAN OF CARE
Problem: Patient Care Overview  Goal: Plan of Care/Patient Progress Review  Outcome: Improving  Pt here with muscular pain and weakness in BUE. AVSS. Neuros intact (grasps are now strong). Stated that he just has a little bit of generalized muscle achiness but declines any intervention because  it is not bad . Regular diet with good PO. PIV SL. VDSP. Up with SBA. Pt has not had flu shot - neurology wants to wait until more is known on what is causing his Sx. Will continue to monitor and follow with POC.

## 2018-10-29 NOTE — PLAN OF CARE
Problem: Patient Care Overview  Goal: Plan of Care/Patient Progress Review  Outcome: Adequate for Discharge Date Met: 10/29/18  VSS. Denies pain. Neuros improving, hand grasps stronger than yesterday. Good po intake. Voiding, had BM yesterday. Up indep. Pt is being discharged home at this time. Discharge instructions gone over, work note sent with pt. Pt declined hospital transportation. Wife here to bring pt home.

## 2018-10-29 NOTE — PLAN OF CARE
Problem: Patient Care Overview  Goal: Plan of Care/Patient Progress Review  PT: Prior to discharge, pt was determined not to have skilled PT needs based on review of chart and conversation with RN and rehab team. Pt is ambulating independently with no balance concerns.

## 2018-10-29 NOTE — PLAN OF CARE
Problem: Patient Care Overview  Goal: Plan of Care/Patient Progress Review  OT/6A    Discharge Planner OT   Patient plan for discharge: home   Current status: OT evaluation completed. Pt presenting with BUE strength 5/5, however, with formal  and fine motor coordination testing, pt well below norms for age. Pt with  strength via dynamometer R: 50 lbs, L 45 lbs (Average for male ages 40-44 R: 116 lbs, L 112 lbs). Pt with fine motor coordination scores via 9 hole peg test R: 21.25 seconds, L 26.76 seconds (Average for male ages 40-44, R: 19.5 sec, L 21.0 sec). Pts B hand deficits not impacting ADLs, however, pt works as  which may be impacted given current scores. Issued B hand strength/coordination HEP for independent completion, pt able to demonstrate independence and verbalized understanding of instruction. Pt meeting OT goals, will discharge from OT.   Barriers to return to prior living situation: Medical readiness  Recommendations for discharge: Home; OP OT pending improvement in deficits/medical diagnosis  Rationale for recommendations: Pt may want to follow up with OP OT to continue  strength/coordination progression if noting continued difficulty with daily tasks or work.         Entered by: Brenna Townsend 10/29/2018 8:39 AM     Occupational Therapy Discharge Summary    Reason for therapy discharge:    All goals and outcomes met, no further needs identified.    Progress towards therapy goal(s). See goals on Care Plan in [facility] electronic health record for goal details.  Goals met    Therapy recommendation(s):    Continued therapy is recommended.  Rationale/Recommendations:  see above.  Continue home exercise program.

## 2018-10-30 ENCOUNTER — PATIENT OUTREACH (OUTPATIENT)
Dept: CARE COORDINATION | Facility: CLINIC | Age: 43
End: 2018-10-30

## 2018-10-30 LAB
ALBUMIN SERPL ELPH-MCNC: 4.1 G/DL (ref 3.7–5.1)
ALPHA1 GLOB SERPL ELPH-MCNC: 0.4 G/DL (ref 0.2–0.4)
ALPHA2 GLOB SERPL ELPH-MCNC: 0.9 G/DL (ref 0.5–0.9)
B-GLOBULIN SERPL ELPH-MCNC: 0.7 G/DL (ref 0.6–1)
GAMMA GLOB SERPL ELPH-MCNC: 1.1 G/DL (ref 0.7–1.6)
M PROTEIN SERPL ELPH-MCNC: 0 G/DL
PROT PATTERN SERPL ELPH-IMP: NORMAL

## 2018-10-31 LAB
COPPER SERPL-MCNC: 103 UG/DL (ref 70–140)
ZINC SERPL-MCNC: 58 UG/DL (ref 60–120)

## 2018-11-01 LAB
ARSENIC BLD-MCNC: <10 UG/L (ref 0–13)
LEAD BLDV-MCNC: <2 UG/DL (ref 0–4.9)
MERCURY BLD-MCNC: <3 UG/L (ref 0–10)

## 2018-11-15 LAB
ANNOTATION COMMENT IMP: NORMAL
EJ AB SER QL: NEGATIVE
ENA JO1 AB TITR SER: 0 AU/ML (ref 0–40)
MDA5 (CADM 140) ABY: NEGATIVE
MI2 AB SER QL: NEGATIVE
NXP-2 (NUCLEAR MATRIX PROTEIN 2) ABY: NEGATIVE
OJ AB SER QL: NEGATIVE
P155/140 (TIF1-GAMMA) ANTIBODY: NEGATIVE
PL12 AB SER QL: NEGATIVE
PL7 AB SER QL: NEGATIVE
SAE1 (SUMO ACTIVATING ENZYME) ABY: NEGATIVE
SRP AB SERPL QL: NEGATIVE
TIF-1 GAMMA ANTIBODY: NEGATIVE

## 2018-11-19 ENCOUNTER — OFFICE VISIT (OUTPATIENT)
Dept: NEUROLOGY | Facility: CLINIC | Age: 43
End: 2018-11-19
Payer: COMMERCIAL

## 2018-11-19 VITALS
DIASTOLIC BLOOD PRESSURE: 85 MMHG | HEIGHT: 69 IN | WEIGHT: 182.4 LBS | HEART RATE: 82 BPM | RESPIRATION RATE: 20 BRPM | SYSTOLIC BLOOD PRESSURE: 126 MMHG | BODY MASS INDEX: 27.02 KG/M2 | OXYGEN SATURATION: 98 % | TEMPERATURE: 98.4 F

## 2018-11-19 DIAGNOSIS — M60.9 MYOSITIS, UNSPECIFIED MYOSITIS TYPE, UNSPECIFIED SITE: Primary | ICD-10-CM

## 2018-11-19 RX ORDER — CYCLOBENZAPRINE HCL 10 MG
10 TABLET ORAL PRN
COMMUNITY
Start: 2018-10-26 | End: 2022-09-19

## 2018-11-19 RX ORDER — MEFLOQUINE HYDROCHLORIDE 250 MG/1
250 TABLET ORAL PRN
COMMUNITY
Start: 2012-03-09 | End: 2022-09-19

## 2018-11-19 ASSESSMENT — ENCOUNTER SYMPTOMS
DECREASED APPETITE: 0
SPEECH CHANGE: 0
BACK PAIN: 0
LOSS OF CONSCIOUSNESS: 0
CHILLS: 0
MUSCLE WEAKNESS: 1
WEAKNESS: 1
INCREASED ENERGY: 0
SEIZURES: 0
WEIGHT GAIN: 0
FEVER: 0
TREMORS: 0
FATIGUE: 1
DIZZINESS: 0
MUSCLE CRAMPS: 0
ARTHRALGIAS: 0
JOINT SWELLING: 0
HALLUCINATIONS: 0
MYALGIAS: 1
TINGLING: 1
POLYPHAGIA: 0
ALTERED TEMPERATURE REGULATION: 0
NECK PAIN: 0
STIFFNESS: 0
POLYDIPSIA: 0
NIGHT SWEATS: 0
DISTURBANCES IN COORDINATION: 0
PARALYSIS: 0
WEIGHT LOSS: 0
MEMORY LOSS: 0
NUMBNESS: 0
HEADACHES: 0

## 2018-11-19 ASSESSMENT — PAIN SCALES - GENERAL: PAINLEVEL: NO PAIN (0)

## 2018-11-19 NOTE — NURSING NOTE
Chief Complaint   Patient presents with     New Patient     ump new patient consultation visit related to weakness       Billie Weston MA

## 2018-11-19 NOTE — LETTER
11/19/2018       RE: Geovany Sarmiento  [address] [facility] [address]     Dear Colleague,    Thank you for referring your patient, Geovany Sarmiento, to the [facility] NEUROLOGY at [facility]. Please see a copy of my visit note below.    Service Date: 11/19/2018      HISTORY OF PRESENT ILLNESS:  Geovany Sarmiento returns for reevaluation.  He was hospitalized on the Neurology Service from 10/27/2018 through 10/29/2018 for evaluation of weakness and myalgias.  His discharge diagnosis was suspected viral myositis.  His 5-month-old daughter had some illness about a week before the onset of his symptoms.      The patient did have an elevated C-reactive protein but a normal CK on 10/26/2018.  By 10/27/2018, his CK had risen to 474.  At the time of his discharge, his CK had come down to nearly normal at 302.  He had a myositis panel done that came back negative.  Additional testing done included a cervical MRI scan which revealed some foraminal narrowing at the C5-C6 level.  The spinal cord looked normal.  He also had an MRI of the left forearm.  There was subtle patchy areas of muscle edema in the left forearm.      Additional testing done just prior to and during this hospitalization included essentially normal comprehensive metabolic panel, negative rheumatoid factor, negative CCP, negative Lyme, borderline positive JUANITO with a titer of 1:40 speckled pattern.      During the hospitalization, the patient did improve, and at discharge, he was having less pain and only some mild hand weakness.      Since discharge, he does tell me his pain is gone but he feels uncomfortable at times, particularly when he is in bed or sitting.  He feels like he has to stretch.  His muscles feel sore.  He fatigues easily.  He does not think his strength is back to 100%.      PHYSICAL EXAMINATION:  Examination today reveals he is alert and cooperative.  Speech is clear.  Facial strength is normal.  He has full  extraocular motility.  Neck strength is normal.  Limb strength normal.  He is able to get up out of the chair without the use of his arms.  He can get down into and out of a squat without difficulty.  He can heel and toe walk.  Position and vibratory sense are intact.  Pinprick is intact.  Gait is unremarkable.  Reflexes are 2+.  Plantar responses are flexor.      IMPRESSION:  Myalgias and weakness -- improved.  Probable viral myositis.      PLAN:  He is improving both objectively and subjectively.  No further evaluation is warranted at this time.      I have recommended following up with me if his symptoms return.         GIORGIO CANDELARIA MD             D: 2018   T: 2018   MT: ann      Name:     JYOTHI JONES   MRN:      [id number]        Account:      [id number]   :      1975           Service Date: 2018      Document: [id number]

## 2018-11-19 NOTE — MR AVS SNAPSHOT
"              After Visit Summary   2018    Geovany Sarmiento    MRN: [id number]           Patient Information     Date Of Birth          1975        Visit Information        Provider Department      2018 12:30 PM Alber Castro MD [facility] Neurology        Today's Diagnoses     Myositis, unspecified myositis type, unspecified site    -  1       Follow-ups after your visit        Follow-up notes from your care team     Discussed this visit Return if symptoms worsen or fail to improve.      Who to contact     Please call your clinic at [phone] to:    Ask questions about your health    Make or cancel appointments    Discuss your medicines    Learn about your test results    Speak to your doctor            Additional Information About Your Visit        MyChart Information     [employer] is an electronic gateway that provides easy, online access to your medical records. With [email or web address], you can request a clinic appointment, read your test results, renew a prescription or communicate with your care team.     To sign up for Mail[employer] visit the website at [email or web address]   You will be asked to enter the access code listed below, as well as some personal information. Please follow the directions to create your username and password.     Your access code is: [email or web address]  Expires: 2019  2:51 PM     Your access code will  in 90 days. If you need help or a new code, please contact your [facility] or call [phone] for assistance.        Care EveryWhere ID     This is your Care EveryWhere ID. This could be used by other organizations to access your [place] medical records  [id number]        Your Vitals Were     Respirations Height BMI (Body Mass Index)             20 1.753 m (5' 9\") 27.05 kg/m2          Blood Pressure from Last 3 Encounters:   10/29/18 127/76   10/26/18 125/86    Weight from Last 3 Encounters:   10/27/18 83.1 kg (183 lb 3.2 oz)   10/26/18 " 83.7 kg (184 lb 9.6 oz)              Today, you had the following     No orders found for display       Primary Care Provider    None Specified       No primary provider on file.        Equal Access to Services     BRI MENDOZA : Hadii aad ku hadbeverlyjesica Kellydeannali, fridanichole stantonmansoorha, usha cheung, lily villaashleigh alcides. So [facility] [phone].    ATENCIÓN: Si habla español, tiene a snyder disposición servicios gratuitos de asistencia lingüística. Llame al [phone].    We comply with applicable federal civil rights laws and [place] laws. We do not discriminate on the basis of race, color, national origin, age, disability, sex, sexual orientation, or gender identity.            Thank you!     Thank you for choosing [facility] NEUROLOGY  for your care. Our goal is always to provide you with excellent care. Hearing back from our patients is one way we can continue to improve our services. Please take a few minutes to complete the written survey that you may receive in the mail after your visit with us. Thank you!             Your Updated Medication List - Protect others around you: Learn how to safely use, store and throw away your medicines at www.disposemymeds.org.          This list is accurate as of 11/19/18 12:31 PM.  Always use your most recent med list.                   Brand Name Dispense Instructions for use Diagnosis    cyclobenzaprine 10 MG tablet    FLEXERIL     10 mg as needed        mefloquine 250 MG tablet    LARIAM     Take 250 mg by mouth as needed        naproxen 500 MG tablet    NAPROSYN    30 tablet    Take 1 tablet (500 mg) by mouth 2 times daily as needed for moderate pain    Myalgia, Arthralgia, unspecified joint

## 2018-11-19 NOTE — PROGRESS NOTES
Service Date: 11/19/2018      HISTORY OF PRESENT ILLNESS:  Geovany Sarmiento returns for reevaluation.  He was hospitalized on the Neurology Service from 10/27/2018 through 10/29/2018 for evaluation of weakness and myalgias.  His discharge diagnosis was suspected viral myositis.  His 5-month-old daughter had some illness about a week before the onset of his symptoms.      The patient did have an elevated C-reactive protein but a normal CK on 10/26/2018.  By 10/27/2018, his CK had risen to 474.  At the time of his discharge, his CK had come down to nearly normal at 302.  He had a myositis panel done that came back negative.  Additional testing done included a cervical MRI scan which revealed some foraminal narrowing at the C5-C6 level.  The spinal cord looked normal.  He also had an MRI of the left forearm.  There was subtle patchy areas of muscle edema in the left forearm.      Additional testing done just prior to and during this hospitalization included essentially normal comprehensive metabolic panel, negative rheumatoid factor, negative CCP, negative Lyme, borderline positive JUANITO with a titer of 1:40 speckled pattern.      During the hospitalization, the patient did improve, and at discharge, he was having less pain and only some mild hand weakness.      Since discharge, he does tell me his pain is gone but he feels uncomfortable at times, particularly when he is in bed or sitting.  He feels like he has to stretch.  His muscles feel sore.  He fatigues easily.  He does not think his strength is back to 100%.      PHYSICAL EXAMINATION:  Examination today reveals he is alert and cooperative.  Speech is clear.  Facial strength is normal.  He has full extraocular motility.  Neck strength is normal.  Limb strength normal.  He is able to get up out of the chair without the use of his arms.  He can get down into and out of a squat without difficulty.  He can heel and toe walk.  Position and vibratory sense are intact.   Pinprick is intact.  Gait is unremarkable.  Reflexes are 2+.  Plantar responses are flexor.      IMPRESSION:  Myalgias and weakness -- improved.  Probable viral myositis.      PLAN:  He is improving both objectively and subjectively.  No further evaluation is warranted at this time.      I have recommended following up with me if his symptoms return.         GIORGIO CANDELARIA MD             D: 2018   T: 2018   MT: ann      Name:     JYOTHI JONES   MRN:      [id number]        Account:      [id number]   :      1975           Service Date: 2018      Document: [id number]

## 2018-11-21 ENCOUNTER — ALLIED HEALTH/NURSE VISIT (OUTPATIENT)
Dept: NURSING | Facility: CLINIC | Age: 43
End: 2018-11-21
Payer: COMMERCIAL

## 2018-11-21 DIAGNOSIS — Z23 NEED FOR PROPHYLACTIC VACCINATION AND INOCULATION AGAINST INFLUENZA: Primary | ICD-10-CM

## 2018-11-21 PROCEDURE — 99207 ZZC NO CHARGE NURSE ONLY: CPT

## 2018-11-21 PROCEDURE — 90471 IMMUNIZATION ADMIN: CPT

## 2018-11-21 PROCEDURE — 90686 IIV4 VACC NO PRSV 0.5 ML IM: CPT

## 2018-11-21 NOTE — MR AVS SNAPSHOT
"              After Visit Summary   2018    Geovany Sarmiento    MRN: [id number]           Patient Information     Date Of Birth          1975        Visit Information        Provider Department      2018 4:15 PM SHABNAM BERKOWITZ [facility]        Today's Diagnoses     Need for prophylactic vaccination and inoculation against influenza    -  1       Follow-ups after your visit        Who to contact     If you have questions or need follow up information about today's clinic visit or your schedule please contact [facility] directly at [phone].  Normal or non-critical lab and imaging results will be communicated to you by [employer], letter or phone within 4 business days after the clinic has received the results. If you do not hear from us within 7 days, please contact the clinic through [employer] or phone. If you have a critical or abnormal lab result, we will notify you by phone as soon as possible.  Submit refill requests through [employer] or call your pharmacy and they will forward the refill request to us. Please allow 3 business days for your refill to be completed.          Additional Information About Your Visit        MyChart Information     [employer] lets you send messages to your doctor, view your test results, renew your prescriptions, schedule appointments and more. To sign up, go to [email or web address] . Click on \"Log in\" on the left side of the screen, which will take you to the Welcome page. Then click on \"Sign up Now\" on the right side of the page.     You will be asked to enter the access code listed below, as well as some personal information. Please follow the directions to create your username and password.     Your access code is: [email or web address]  Expires: 2019  2:51 PM     Your access code will  in 90 days. If you need help or a new code, please call your [facility] or [phone].        Care EveryWhere ID     This is your Care EveryWhere " ID. This could be used by other organizations to access your [place] medical records  [id number]         Blood Pressure from Last 3 Encounters:   11/19/18 126/85   10/29/18 127/76   10/26/18 125/86    Weight from Last 3 Encounters:   11/19/18 182 lb 6.4 oz (82.7 kg)   10/27/18 183 lb 3.2 oz (83.1 kg)   10/26/18 184 lb 9.6 oz (83.7 kg)              We Performed the Following     FLU VACCINE, SPLIT VIRUS, IM (QUADRIVALENT) [59561]- >3 YRS     Vaccine Administration, Initial [04056]        Primary Care Provider Office Phone # Fax #    [facility] [phone] [address] [facility] [address]        Equal Access to Services     BRI MENDOZA : Hadii aad ku hadasho Sokimberly, waaxda luqadaha, qaybta kaalmada adelisset, lily mcgrath. So [facility] [phone].    ATENCIÓN: Si habla español, tiene a snyder disposición servicios gratuitos de asistencia lingüística. Wan al [phone].    We comply with applicable federal civil rights laws and [place] laws. We do not discriminate on the basis of race, color, national origin, age, disability, sex, sexual orientation, or gender identity.            Thank you!     Thank you for choosing [facility]  for your care. Our goal is always to provide you with excellent care. Hearing back from our patients is one way we can continue to improve our services. Please take a few minutes to complete the written survey that you may receive in the mail after your visit with us. Thank you!             Your Updated Medication List - Protect others around you: Learn how to safely use, store and throw away your medicines at www.disposemymeds.org.          This list is accurate as of 11/21/18  4:16 PM.  Always use your most recent med list.                   Brand Name Dispense Instructions for use Diagnosis    cyclobenzaprine 10 MG tablet    FLEXERIL     10 mg as needed        mefloquine 250 MG tablet    LARIAM     Take  250 mg by mouth as needed        naproxen 500 MG tablet    NAPROSYN    30 tablet    Take 1 tablet (500 mg) by mouth 2 times daily as needed for moderate pain    Myalgia, Arthralgia, unspecified joint

## 2018-11-21 NOTE — PROGRESS NOTES

## 2019-09-30 ENCOUNTER — OFFICE VISIT (OUTPATIENT)
Dept: URGENT CARE | Facility: URGENT CARE | Age: 44
End: 2019-09-30
Payer: COMMERCIAL

## 2019-09-30 VITALS
SYSTOLIC BLOOD PRESSURE: 122 MMHG | TEMPERATURE: 98.6 F | BODY MASS INDEX: 29.18 KG/M2 | WEIGHT: 197.6 LBS | OXYGEN SATURATION: 97 % | HEART RATE: 88 BPM | DIASTOLIC BLOOD PRESSURE: 83 MMHG | RESPIRATION RATE: 14 BRPM

## 2019-09-30 DIAGNOSIS — J03.90 TONSILLITIS: ICD-10-CM

## 2019-09-30 DIAGNOSIS — Z20.818 EXPOSURE TO STREP THROAT: ICD-10-CM

## 2019-09-30 DIAGNOSIS — R07.0 THROAT PAIN: Primary | ICD-10-CM

## 2019-09-30 LAB
DEPRECATED S PYO AG THROAT QL EIA: NORMAL
SPECIMEN SOURCE: NORMAL

## 2019-09-30 PROCEDURE — 87880 STREP A ASSAY W/OPTIC: CPT | Performed by: FAMILY MEDICINE

## 2019-09-30 PROCEDURE — 87081 CULTURE SCREEN ONLY: CPT | Performed by: FAMILY MEDICINE

## 2019-09-30 PROCEDURE — 99203 OFFICE O/P NEW LOW 30 MIN: CPT | Performed by: FAMILY MEDICINE

## 2019-09-30 RX ORDER — AMOXICILLIN 500 MG/1
500 CAPSULE ORAL 2 TIMES DAILY
Qty: 20 CAPSULE | Refills: 0 | Status: SHIPPED | OUTPATIENT
Start: 2019-09-30 | End: 2019-10-10

## 2019-09-30 ASSESSMENT — PAIN SCALES - GENERAL: PAINLEVEL: MILD PAIN (2)

## 2019-09-30 NOTE — PROGRESS NOTES
Chief Complaint   Patient presents with     Pharyngitis     x 4 days          SUBJECTIVE:  Geovany Sarmiento is a 44 year old male with recent exposure to strep  with a chief complaint of sore throat.  Onset of symptoms was 4 day(s) ago.    Course of illness: worsening.  Severity moderate  Current and Associated symptoms: sore throat and white spots on tonsils   Treatment measures tried include Tylenol/Ibuprofen.  Predisposing factors include exposure to strep.    Past Medical History:   Diagnosis Date     Cyst near [facility]      Current Outpatient Medications   Medication Sig Dispense Refill     amoxicillin (AMOXIL) 500 MG capsule Take 1 capsule (500 mg) by mouth 2 times daily for 10 days 20 capsule 0     cyclobenzaprine (FLEXERIL) 10 MG tablet 10 mg as needed       mefloquine (LARIAM) 250 MG tablet Take 250 mg by mouth as needed       naproxen (NAPROSYN) 500 MG tablet Take 1 tablet (500 mg) by mouth 2 times daily as needed for moderate pain (Patient not taking: Reported on 9/30/2019) 30 tablet 1     Social History     Tobacco Use     Smoking status: Former Smoker     Packs/day: 0.00     Smokeless tobacco: Never Used   Substance Use Topics     Alcohol use: Yes     Comment: 2-3 drinks/wk       ROS:  10 point ROS of systems including Constitutional, Eyes, Respiratory, Cardiovascular, Gastroenterology, Genitourinary, Integumentary, Muscularskeletal, Psychiatric were all negative except for pertinent positives noted in my HPI           OBJECTIVE:   /83 (BP Location: Left arm, Patient Position: Sitting, Cuff Size: Adult Regular)   Pulse 88   Temp 98.6  F (37  C) (Oral)   Resp 14   Wt 89.6 kg (197 lb 9.6 oz)   SpO2 97%   BMI 29.18 kg/m    GENERAL APPEARANCE: healthy, alert and no distress  EYES: EOMI,  PERRL, conjunctiva clear  HENT: ear canals and TM's normal.  Nose normal.  Pharynx erythematous with some exudate noted.  NECK: supple, non-tender to palpation, no adenopathy noted  RESP: lungs clear to  auscultation - no rales, rhonchi or wheezes  CV: regular rates and rhythm, normal S1 S2, no murmur noted  ABDOMEN:  soft, nontender, no HSM or masses and bowel sounds normal  SKIN: no suspicious lesions or rashes    Rapid Strep test is negative; await throat culture results.    ASSESSMENT:      Throat pain  Exposure to strep throat  Tonsillitis    PLAN:   See orders in epic.    Symptomatic treat with gargles, lozenges, and OTC analgesic as needed. Follow-up with primary clinic if not improving.  Strep was negative I decided to treat him with antibiotic as the tonsils look enlarged and there was white spots noted.  Patient was encouraged to do Tylenol or ibuprofen for the pain and also do warm gargling with salt water.  Patient was advised if notices any worsening of symptoms should follow-up for further evaluation and treatment.    Follow up if  symptoms fail to improve or worsens   Pt understood and agreed with plan     Sherita Tejada MD

## 2019-10-01 LAB
BACTERIA SPEC CULT: NORMAL
SPECIMEN SOURCE: NORMAL

## 2020-10-20 ENCOUNTER — ALLIED HEALTH/NURSE VISIT (OUTPATIENT)
Dept: NURSING | Facility: CLINIC | Age: 45
End: 2020-10-20
Payer: COMMERCIAL

## 2020-10-20 DIAGNOSIS — Z23 NEED FOR PROPHYLACTIC VACCINATION AND INOCULATION AGAINST INFLUENZA: Primary | ICD-10-CM

## 2020-10-20 PROCEDURE — 90471 IMMUNIZATION ADMIN: CPT

## 2020-10-20 PROCEDURE — 90686 IIV4 VACC NO PRSV 0.5 ML IM: CPT

## 2022-09-19 ENCOUNTER — OFFICE VISIT (OUTPATIENT)
Dept: FAMILY MEDICINE | Facility: CLINIC | Age: 47
End: 2022-09-19
Payer: COMMERCIAL

## 2022-09-19 VITALS
BODY MASS INDEX: 28.73 KG/M2 | HEIGHT: 69 IN | WEIGHT: 194 LBS | OXYGEN SATURATION: 97 % | SYSTOLIC BLOOD PRESSURE: 121 MMHG | TEMPERATURE: 97.4 F | HEART RATE: 84 BPM | DIASTOLIC BLOOD PRESSURE: 83 MMHG

## 2022-09-19 DIAGNOSIS — Z12.11 COLON CANCER SCREENING: ICD-10-CM

## 2022-09-19 DIAGNOSIS — Z00.00 ROUTINE GENERAL MEDICAL EXAMINATION AT A HEALTH CARE FACILITY: Primary | ICD-10-CM

## 2022-09-19 DIAGNOSIS — Z23 NEED FOR PROPHYLACTIC VACCINATION AND INOCULATION AGAINST INFLUENZA: ICD-10-CM

## 2022-09-19 LAB
BASOPHILS # BLD AUTO: 0 10E3/UL (ref 0–0.2)
BASOPHILS NFR BLD AUTO: 0 %
EOSINOPHIL # BLD AUTO: 0.1 10E3/UL (ref 0–0.7)
EOSINOPHIL NFR BLD AUTO: 2 %
ERYTHROCYTE [DISTWIDTH] IN BLOOD BY AUTOMATED COUNT: 12.4 % (ref 10–15)
HBA1C MFR BLD: 5.4 % (ref 0–5.6)
HCT VFR BLD AUTO: 42.9 % (ref 40–53)
HGB BLD-MCNC: 15.4 G/DL (ref 13.3–17.7)
LYMPHOCYTES # BLD AUTO: 2 10E3/UL (ref 0.8–5.3)
LYMPHOCYTES NFR BLD AUTO: 29 %
MCH RBC QN AUTO: 32.2 PG (ref 26.5–33)
MCHC RBC AUTO-ENTMCNC: 35.9 G/DL (ref 31.5–36.5)
MCV RBC AUTO: 90 FL (ref 78–100)
MONOCYTES # BLD AUTO: 0.8 10E3/UL (ref 0–1.3)
MONOCYTES NFR BLD AUTO: 12 %
NEUTROPHILS # BLD AUTO: 4 10E3/UL (ref 1.6–8.3)
NEUTROPHILS NFR BLD AUTO: 57 %
PLATELET # BLD AUTO: 269 10E3/UL (ref 150–450)
RBC # BLD AUTO: 4.78 10E6/UL (ref 4.4–5.9)
WBC # BLD AUTO: 7 10E3/UL (ref 4–11)

## 2022-09-19 PROCEDURE — 80061 LIPID PANEL: CPT | Performed by: FAMILY MEDICINE

## 2022-09-19 PROCEDURE — 80053 COMPREHEN METABOLIC PANEL: CPT | Performed by: FAMILY MEDICINE

## 2022-09-19 PROCEDURE — 36415 COLL VENOUS BLD VENIPUNCTURE: CPT | Performed by: FAMILY MEDICINE

## 2022-09-19 PROCEDURE — 83721 ASSAY OF BLOOD LIPOPROTEIN: CPT | Mod: 59 | Performed by: FAMILY MEDICINE

## 2022-09-19 PROCEDURE — 85025 COMPLETE CBC W/AUTO DIFF WBC: CPT | Performed by: FAMILY MEDICINE

## 2022-09-19 PROCEDURE — 83036 HEMOGLOBIN GLYCOSYLATED A1C: CPT | Performed by: FAMILY MEDICINE

## 2022-09-19 PROCEDURE — 87389 HIV-1 AG W/HIV-1&-2 AB AG IA: CPT | Performed by: FAMILY MEDICINE

## 2022-09-19 PROCEDURE — 86803 HEPATITIS C AB TEST: CPT | Performed by: FAMILY MEDICINE

## 2022-09-19 PROCEDURE — 90471 IMMUNIZATION ADMIN: CPT | Performed by: FAMILY MEDICINE

## 2022-09-19 PROCEDURE — 90686 IIV4 VACC NO PRSV 0.5 ML IM: CPT | Performed by: FAMILY MEDICINE

## 2022-09-19 PROCEDURE — 99396 PREV VISIT EST AGE 40-64: CPT | Mod: 25 | Performed by: FAMILY MEDICINE

## 2022-09-19 ASSESSMENT — ENCOUNTER SYMPTOMS
EYE PAIN: 0
JOINT SWELLING: 0
FREQUENCY: 0
NERVOUS/ANXIOUS: 0
CONSTIPATION: 0
SHORTNESS OF BREATH: 0
ABDOMINAL PAIN: 0
HEMATURIA: 0
NAUSEA: 0
COUGH: 0
HEARTBURN: 0
PALPITATIONS: 0
CHILLS: 0
HEADACHES: 0
WEAKNESS: 0
DIZZINESS: 0
DYSURIA: 0
DIARRHEA: 0
ARTHRALGIAS: 0
SORE THROAT: 0
FEVER: 0
HEMATOCHEZIA: 0
MYALGIAS: 0
PARESTHESIAS: 0

## 2022-09-19 ASSESSMENT — PAIN SCALES - GENERAL: PAINLEVEL: NO PAIN (0)

## 2022-09-19 NOTE — LETTER
October 4, 2022      Geovany Sarmiento  [address] [facility] [address]        Dear ,    We are writing to inform you of your test results.    LDL(bad) cholesterol level is elevated, HDL(good) cholesterol level is low and your triglycerides are elevated which can increase your heart disease risk.  A diet high in fat and simple carbohydrates, genetics and being overweight can contribute to this. ADVISE: exercising 150 minutes of aerobic exercise per week (30 minutes for 5 days per week or 50 minutes for 3 days per week are options), eating a low saturated fat/low carbohydrate diet, and omega-3 fatty acids (fish oil) 1088-2158 mg daily are helpful to improve this. In 12 months, you should recheck your fasting cholesterol panel by scheduling a lab-only appointment.   Please continue with the plan as we discussed in the clinic.   Feel free to contact the clinic with any questions or concerns. Thank you for allowing us to participate in your care.       Resulted Orders   HIV Antigen Antibody Combo   Result Value Ref Range    HIV Antigen Antibody Combo Nonreactive Nonreactive      Comment:      HIV-1 p24 Ag & HIV-1/HIV-2 Ab Not Detected   Hepatitis C Screen Reflex to HCV RNA Quant and Genotype   Result Value Ref Range    Hepatitis C Antibody Nonreactive Nonreactive    Narrative    Assay performance characteristics have not been established for newborns, infants, and children.   Lipid panel reflex to direct LDL Non-fasting   Result Value Ref Range    Cholesterol 256 (H) <200 mg/dL    Triglycerides 442 (H) <150 mg/dL    Direct Measure HDL 36 (L) >=40 mg/dL    LDL Cholesterol Calculated        Comment:      Cannot estimate LDL when triglyceride exceeds 400 mg/dL    Non HDL Cholesterol 220 (H) <130 mg/dL    Patient Fasting > 8hrs? No     Narrative    Cholesterol  Desirable:  <200 mg/dL    Triglycerides  Normal:  Less than 150 mg/dL  Borderline High:  150-199 mg/dL  High:  200-499 mg/dL  Very High:  Greater than or  equal to 500 mg/dL    Direct Measure HDL  Female:  Greater than or equal to 50 mg/dL   Male:  Greater than or equal to 40 mg/dL    LDL Cholesterol  Desirable:  <100mg/dL  Above Desirable:  100-129 mg/dL   Borderline High:  130-159 mg/dL   High:  160-189 mg/dL   Very High:  >= 190 mg/dL    Non HDL Cholesterol  Desirable:  130 mg/dL  Above Desirable:  130-159 mg/dL  Borderline High:  160-189 mg/dL  High:  190-219 mg/dL  Very High:  Greater than or equal to 220 mg/dL   Hemoglobin A1c   Result Value Ref Range    Hemoglobin A1C 5.4 0.0 - 5.6 %      Comment:      Normal <5.7%   Prediabetes 5.7-6.4%    Diabetes 6.5% or higher     Note: Adopted from ADA consensus guidelines.   Comprehensive metabolic panel (BMP + Alb, Alk Phos, ALT, AST, Total. Bili, TP)   Result Value Ref Range    Sodium 139 133 - 144 mmol/L    Potassium 3.9 3.4 - 5.3 mmol/L    Chloride 107 94 - 109 mmol/L    Carbon Dioxide (CO2) 28 20 - 32 mmol/L    Anion Gap 4 3 - 14 mmol/L    Urea Nitrogen 20 7 - 30 mg/dL    Creatinine 0.90 0.66 - 1.25 mg/dL    Calcium 8.9 8.5 - 10.1 mg/dL    Glucose 115 (H) 70 - 99 mg/dL    Alkaline Phosphatase 51 40 - 150 U/L    AST 17 0 - 45 U/L    ALT 20 0 - 70 U/L    Protein Total 7.2 6.8 - 8.8 g/dL    Albumin 4.0 3.4 - 5.0 g/dL    Bilirubin Total 0.5 0.2 - 1.3 mg/dL    GFR Estimate >90 >60 mL/min/1.73m2      Comment:      Effective December 21, 2021 eGFRcr in adults is calculated using the 2021 CKD-EPI creatinine equation which includes age and gender (Jami johnston al., NEJ, DOI: 10.1056/GPWGan9384677)   CBC with platelets and differential   Result Value Ref Range    WBC Count 7.0 4.0 - 11.0 10e3/uL    RBC Count 4.78 4.40 - 5.90 10e6/uL    Hemoglobin 15.4 13.3 - 17.7 g/dL    Hematocrit 42.9 40.0 - 53.0 %    MCV 90 78 - 100 fL    MCH 32.2 26.5 - 33.0 pg    MCHC 35.9 31.5 - 36.5 g/dL    RDW 12.4 10.0 - 15.0 %    Platelet Count 269 150 - 450 10e3/uL    % Neutrophils 57 %    % Lymphocytes 29 %    % Monocytes 12 %    % Eosinophils 2 %     % Basophils 0 %    Absolute Neutrophils 4.0 1.6 - 8.3 10e3/uL    Absolute Lymphocytes 2.0 0.8 - 5.3 10e3/uL    Absolute Monocytes 0.8 0.0 - 1.3 10e3/uL    Absolute Eosinophils 0.1 0.0 - 0.7 10e3/uL    Absolute Basophils 0.0 0.0 - 0.2 10e3/uL   LDL cholesterol direct   Result Value Ref Range    LDL Cholesterol Direct 160 (H) <100 mg/dL      Comment:      Age 0-19 years:  Desirable: 0-110 mg/dL   Borderline high: 110-129 mg/dL   High: >= 130 mg/dL    Age 20 years and older:  Desirable: <100mg/dL  Above desirable: 100-129 mg/dL   Borderline high: 130-159 mg/dL   High: 160-189 mg/dL   Very high: >= 190 mg/dL       If you have any questions or concerns, please call the clinic at the number listed above.       Sincerely,      Julia Chandler MD

## 2022-09-19 NOTE — PROGRESS NOTES
SUBJECTIVE:   CC: Geovany is an 47 year old who presents for preventative health visit.       Patient has been advised of split billing requirements and indicates understanding: Yes  Healthy Habits:     Getting at least 3 servings of Calcium per day:  Yes    Bi-annual eye exam:  NO    Dental care twice a year:  NO    Sleep apnea or symptoms of sleep apnea:  None    Diet:  Regular (no restrictions)    Frequency of exercise:  None    Taking medications regularly:  Not Applicable    Medication side effects:  Not applicable    PHQ-2 Total Score: 0    Additional concerns today:  No        Preventive -     Immunization History   Administered Date(s) Administered     COVID-19,PF,Daquan 04/10/2021     COVID-19,PF,Pfizer (12+ Yrs) 11/08/2021, 11/29/2021, 01/05/2022, 02/10/2022     FLU 6-35 months 10/21/2011, 11/01/2015     Flu, Unspecified 10/28/2010     Influenza (IIV3) PF 10/24/2010, 10/22/2012     Influenza Vaccine IM > 6 months Valent IIV4 (Alfuria,Fluzone) 10/11/2017, 11/21/2018, 02/24/2020, 10/20/2020, 01/05/2022     TDAP Vaccine (Adacel) 06/17/2010     Tdap (Adacel,Boostrix) 02/24/2020     Twinrix A/B 03/09/2012, 04/03/2012, 10/22/2012     Typhoid IM 03/09/2012     Yellow Fever 03/09/2012       -HIV/Hep C screen:       - Colon CA screen: Colonoscopy, age 45-75 every 10 years or FIT every year or Cologuard every 3 years         -lipids screen: ordered     Diabetes screen: ordered           Today's PHQ-2 Score:   PHQ-2 ( 1999 Pfizer) 9/19/2022   Q1: Little interest or pleasure in doing things 0   Q2: Feeling down, depressed or hopeless 0   PHQ-2 Score 0   PHQ-2 Total Score (12-17 Years)- Positive if 3 or more points; Administer PHQ-A if positive -   Q1: Little interest or pleasure in doing things Not at all   Q2: Feeling down, depressed or hopeless Not at all   PHQ-2 Score 0   SH:    Marital status:    Kids: 2  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 Yes    Have you ever done Advance Care Planning? (For example, a Health Directive, POLST, or a discussion with a medical provider or your loved ones about your wishes): Yes, advance care planning is on file.    Social History     Tobacco Use     Smoking status: Former Smoker     Packs/day: 0.00     Smokeless tobacco: Never Used   Substance Use Topics     Alcohol use: Yes     Comment: 2-3 drinks/wk     If you drink alcohol do you typically have >3 drinks per day or >7 drinks per week? No    Alcohol Use 9/19/2022   Prescreen: >3 drinks/day or >7 drinks/week? No   No flowsheet data found.    Last PSA: No results found for: PSA    Reviewed orders with patient. Reviewed health maintenance and updated orders accordingly - Yes  Lab work is in process  BP Readings from Last 3 Encounters:   09/19/22 121/83   09/30/19 122/83   11/19/18 126/85    Wt Readings from Last 3 Encounters:   09/19/22 88 kg (194 lb)   09/30/19 89.6 kg (197 lb 9.6 oz)   11/19/18 82.7 kg (182 lb 6.4 oz)                  Patient Active Problem List   Diagnosis     Weakness generalized     Myositis, unspecified myositis type, unspecified site     History reviewed. No pertinent surgical history.    Social History     Tobacco Use     Smoking status: Former Smoker     Packs/day: 0.00     Smokeless tobacco: Never Used   Substance Use Topics     Alcohol use: Yes     Comment: 2-3 drinks/wk     Family History   Problem Relation Age of Onset     Hypertension Father      Colon Cancer Paternal Grandmother          No current outpatient medications on file.     Allergies   Allergen Reactions     Contrast Dye Hives     Diatrizoate Rash     IVP dye     Recent Labs   Lab Test 10/28/18  0747 10/27/18  2224 10/27/18  1653 10/26/18  1518   ALT  --   --  22 18   CR 0.75  --  0.83 0.92   GFRESTIMATED >90  --  >90 90  "  GFRESTBLACK >90  --  >90 >90   POTASSIUM 4.2  --  3.8 3.7   TSH  --  1.58  --   --         Reviewed and updated as needed this visit by clinical staff   Tobacco  Allergies  Meds                Reviewed and updated as needed this visit by Provider                   Past Medical History:   Diagnosis Date     Cyst near tailbone      Kidney stone       History reviewed. No pertinent surgical history.    Review of Systems   Constitutional: Negative for chills and fever.   HENT: Negative for congestion, ear pain, hearing loss and sore throat.    Eyes: Negative for pain and visual disturbance.   Respiratory: Negative for cough and shortness of breath.    Cardiovascular: Negative for chest pain, palpitations and peripheral edema.   Gastrointestinal: Negative for abdominal pain, constipation, diarrhea, heartburn, hematochezia and nausea.   Genitourinary: Negative for dysuria, frequency, genital sores, hematuria, impotence, penile discharge and urgency.   Musculoskeletal: Negative for arthralgias, joint swelling and myalgias.   Skin: Negative for rash.   Neurological: Negative for dizziness, weakness, headaches and paresthesias.   Psychiatric/Behavioral: Negative for mood changes. The patient is not nervous/anxious.          OBJECTIVE:   /83 (BP Location: Left arm, Patient Position: Sitting, Cuff Size: Adult Regular)   Pulse 84   Temp 97.4  F (36.3  C) (Tympanic)   Ht 1.753 m (5' 9\")   Wt 88 kg (194 lb)   SpO2 97%   BMI 28.65 kg/m      Physical Exam  GENERAL: healthy, alert and no distress  EYES: Eyes grossly normal to inspection, PERRL and conjunctivae and sclerae normal  HENT: ear canals and TM's normal, nose and mouth without ulcers or lesions  NECK: no adenopathy, no asymmetry, masses, or scars and thyroid normal to palpation  RESP: lungs clear to auscultation - no rales, rhonchi or wheezes  CV: regular rate and rhythm, normal S1 S2, no S3 or S4, no murmur, click or rub, no peripheral edema and peripheral " "pulses strong  ABDOMEN: soft, nontender, no hepatosplenomegaly, no masses and bowel sounds normal  MS: no gross musculoskeletal defects noted, no edema  SKIN: no suspicious lesions or rashes  NEURO: Normal strength and tone, mentation intact and speech normal  PSYCH: mentation appears normal, affect normal/bright        ASSESSMENT/PLAN:       ICD-10-CM    1. Routine general medical examination at a health care facility  Z00.00 HIV Antigen Antibody Combo     Hepatitis C Screen Reflex to HCV RNA Quant and Genotype     Lipid panel reflex to direct LDL Non-fasting     Hemoglobin A1c     Comprehensive metabolic panel (BMP + Alb, Alk Phos, ALT, AST, Total. Bili, TP)     CBC with platelets and differential   2. Colon cancer screening  Z12.11 Colonoscopy Screening  Referral   3. Need for prophylactic vaccination and inoculation against influenza  Z23 INFLUENZA VACCINE IM > 6 MONTHS VALENT IIV4 (AFLURIA/FLUZONE)        -We discussed recommendation of 150 min moderate intensity exercise /week   - We discussed the need for heart healthy diet including a diet rich in fruits, vegetables and fiber and very low on carbonated beverages sugar  - We discussed recommendation of moderation of alcohol, salt and NSAIDs.    Patient has been advised of split billing requirements and indicates understanding: Yes    COUNSELING:   Reviewed preventive health counseling, as reflected in patient instructions       Regular exercise       Healthy diet/nutrition       Immunizations    Vaccinated for: Influenza             Colorectal cancer screening    Estimated body mass index is 28.65 kg/m  as calculated from the following:    Height as of this encounter: 1.753 m (5' 9\").    Weight as of this encounter: 88 kg (194 lb).     Weight management plan: Discussed healthy diet and exercise guidelines    He reports that he has quit smoking. He smoked 0.00 packs per day. He has never used smokeless tobacco.      Counseling Resources:  ATP IV " Guidelines  Pooled Cohorts Equation Calculator  FRAX Risk Assessment  ICSI Preventive Guidelines  Dietary Guidelines for Americans, 2010  USDA's MyPlate  ASA Prophylaxis  Lung CA Screening    Julia Chandler MD  [facility]

## 2022-09-19 NOTE — NURSING NOTE
Prior to immunization administration, verified patients identity using patient s name and date of birth. Please see Immunization Activity for additional information.     Screening Questionnaire for Adult Immunization    Are you sick today?   No   Do you have allergies to medications, food, a vaccine component or latex?   No   Have you ever had a serious reaction after receiving a vaccination?   No   Do you have a long-term health problem with heart, lung, kidney, or metabolic disease (e.g., diabetes), asthma, a blood disorder, no spleen, complement component deficiency, a cochlear implant, or a spinal fluid leak?  Are you on long-term aspirin therapy?   No   Do you have cancer, leukemia, HIV/AIDS, or any other immune system problem?   No   Do you have a parent, brother, or sister with an immune system problem?   No   In the past 3 months, have you taken medications that affect  your immune system, such as prednisone, other steroids, or anticancer drugs; drugs for the treatment of rheumatoid arthritis, Crohn s disease, or psoriasis; or have you had radiation treatments?   No   Have you had a seizure, or a brain or other nervous system problem?   No   During the past year, have you received a transfusion of blood or blood    products, or been given immune (gamma) globulin or antiviral drug?   No   For women: Are you pregnant or is there a chance you could become       pregnant during the next month?   No   Have you received any vaccinations in the past 4 weeks?   No     Immunization questionnaire answers were all negative.        Per orders of Dr. Manzanares, injection of flu given by Sarah Mitchell CMA. Patient instructed to remain in clinic for 15 minutes afterwards, and to report any adverse reaction to me immediately.       Screening performed by Sarah Mitchell CMA on 9/19/2022 at 2:28 PM.

## 2022-09-20 LAB
ALBUMIN SERPL-MCNC: 4 G/DL (ref 3.4–5)
ALP SERPL-CCNC: 51 U/L (ref 40–150)
ALT SERPL W P-5'-P-CCNC: 20 U/L (ref 0–70)
ANION GAP SERPL CALCULATED.3IONS-SCNC: 4 MMOL/L (ref 3–14)
AST SERPL W P-5'-P-CCNC: 17 U/L (ref 0–45)
BILIRUB SERPL-MCNC: 0.5 MG/DL (ref 0.2–1.3)
BUN SERPL-MCNC: 20 MG/DL (ref 7–30)
CALCIUM SERPL-MCNC: 8.9 MG/DL (ref 8.5–10.1)
CHLORIDE BLD-SCNC: 107 MMOL/L (ref 94–109)
CHOLEST SERPL-MCNC: 256 MG/DL
CO2 SERPL-SCNC: 28 MMOL/L (ref 20–32)
CREAT SERPL-MCNC: 0.9 MG/DL (ref 0.66–1.25)
FASTING STATUS PATIENT QL REPORTED: NO
GFR SERPL CREATININE-BSD FRML MDRD: >90 ML/MIN/1.73M2
GLUCOSE BLD-MCNC: 115 MG/DL (ref 70–99)
HCV AB SERPL QL IA: NONREACTIVE
HDLC SERPL-MCNC: 36 MG/DL
HIV 1+2 AB+HIV1 P24 AG SERPL QL IA: NONREACTIVE
LDLC SERPL CALC-MCNC: 160 MG/DL
LDLC SERPL CALC-MCNC: ABNORMAL MG/DL
NONHDLC SERPL-MCNC: 220 MG/DL
POTASSIUM BLD-SCNC: 3.9 MMOL/L (ref 3.4–5.3)
PROT SERPL-MCNC: 7.2 G/DL (ref 6.8–8.8)
SODIUM SERPL-SCNC: 139 MMOL/L (ref 133–144)
TRIGL SERPL-MCNC: 442 MG/DL